# Patient Record
Sex: FEMALE | Race: BLACK OR AFRICAN AMERICAN | NOT HISPANIC OR LATINO | Employment: FULL TIME | URBAN - METROPOLITAN AREA
[De-identification: names, ages, dates, MRNs, and addresses within clinical notes are randomized per-mention and may not be internally consistent; named-entity substitution may affect disease eponyms.]

---

## 2017-09-01 ENCOUNTER — APPOINTMENT (OUTPATIENT)
Dept: CT IMAGING | Facility: HOSPITAL | Age: 42
End: 2017-09-01

## 2017-09-01 ENCOUNTER — HOSPITAL ENCOUNTER (EMERGENCY)
Facility: HOSPITAL | Age: 42
Discharge: HOME | End: 2017-09-01
Attending: EMERGENCY MEDICINE | Admitting: EMERGENCY MEDICINE

## 2017-09-01 VITALS
HEIGHT: 68 IN | RESPIRATION RATE: 16 BRPM | OXYGEN SATURATION: 97 % | BODY MASS INDEX: 34.86 KG/M2 | WEIGHT: 230 LBS | DIASTOLIC BLOOD PRESSURE: 80 MMHG | HEART RATE: 77 BPM | TEMPERATURE: 98 F | SYSTOLIC BLOOD PRESSURE: 131 MMHG

## 2017-09-01 DIAGNOSIS — R10.2 PELVIC PAIN: ICD-10-CM

## 2017-09-01 DIAGNOSIS — R10.9 ABDOMINAL PAIN, UNSPECIFIED LOCATION: Primary | ICD-10-CM

## 2017-09-01 LAB
ALBUMIN SERPL-MCNC: 4 G/DL (ref 3.5–5.2)
ALBUMIN/GLOB SERPL: 1.3 G/DL
ALP SERPL-CCNC: 81 U/L (ref 39–117)
ALT SERPL W P-5'-P-CCNC: 13 U/L (ref 1–33)
ANION GAP SERPL CALCULATED.3IONS-SCNC: 10.3 MMOL/L
AST SERPL-CCNC: 17 U/L (ref 1–32)
BACTERIA UR QL AUTO: ABNORMAL /HPF
BASOPHILS # BLD AUTO: 0.05 10*3/MM3 (ref 0–0.2)
BASOPHILS NFR BLD AUTO: 0.7 % (ref 0–1.5)
BILIRUB SERPL-MCNC: 0.4 MG/DL (ref 0.1–1.2)
BILIRUB UR QL STRIP: NEGATIVE
BUN BLD-MCNC: 10 MG/DL (ref 6–20)
BUN/CREAT SERPL: 14.5 (ref 7–25)
CALCIUM SPEC-SCNC: 9.4 MG/DL (ref 8.6–10.5)
CHLORIDE SERPL-SCNC: 104 MMOL/L (ref 98–107)
CLARITY UR: CLEAR
CO2 SERPL-SCNC: 26.7 MMOL/L (ref 22–29)
COLOR UR: YELLOW
CREAT BLD-MCNC: 0.69 MG/DL (ref 0.57–1)
DEPRECATED RDW RBC AUTO: 43 FL (ref 37–54)
EOSINOPHIL # BLD AUTO: 0.41 10*3/MM3 (ref 0–0.7)
EOSINOPHIL NFR BLD AUTO: 5.7 % (ref 0.3–6.2)
ERYTHROCYTE [DISTWIDTH] IN BLOOD BY AUTOMATED COUNT: 13 % (ref 11.7–13)
GFR SERPL CREATININE-BSD FRML MDRD: 114 ML/MIN/1.73
GLOBULIN UR ELPH-MCNC: 3.1 GM/DL
GLUCOSE BLD-MCNC: 96 MG/DL (ref 65–99)
GLUCOSE UR STRIP-MCNC: NEGATIVE MG/DL
HCG SERPL QL: NEGATIVE
HCT VFR BLD AUTO: 39.2 % (ref 35.6–45.5)
HGB BLD-MCNC: 13.5 G/DL (ref 11.9–15.5)
HGB UR QL STRIP.AUTO: NEGATIVE
HYALINE CASTS UR QL AUTO: ABNORMAL /LPF
IMM GRANULOCYTES # BLD: 0 10*3/MM3 (ref 0–0.03)
IMM GRANULOCYTES NFR BLD: 0 % (ref 0–0.5)
KETONES UR QL STRIP: NEGATIVE
LEUKOCYTE ESTERASE UR QL STRIP.AUTO: ABNORMAL
LIPASE SERPL-CCNC: 12 U/L (ref 13–60)
LYMPHOCYTES # BLD AUTO: 2.13 10*3/MM3 (ref 0.9–4.8)
LYMPHOCYTES NFR BLD AUTO: 29.5 % (ref 19.6–45.3)
MCH RBC QN AUTO: 31.4 PG (ref 26.9–32)
MCHC RBC AUTO-ENTMCNC: 34.4 G/DL (ref 32.4–36.3)
MCV RBC AUTO: 91.2 FL (ref 80.5–98.2)
MONOCYTES # BLD AUTO: 0.63 10*3/MM3 (ref 0.2–1.2)
MONOCYTES NFR BLD AUTO: 8.7 % (ref 5–12)
NEUTROPHILS # BLD AUTO: 4 10*3/MM3 (ref 1.9–8.1)
NEUTROPHILS NFR BLD AUTO: 55.4 % (ref 42.7–76)
NITRITE UR QL STRIP: NEGATIVE
PH UR STRIP.AUTO: 7 [PH] (ref 5–8)
PLATELET # BLD AUTO: 258 10*3/MM3 (ref 140–500)
PMV BLD AUTO: 9.8 FL (ref 6–12)
POTASSIUM BLD-SCNC: 4.1 MMOL/L (ref 3.5–5.2)
PROT SERPL-MCNC: 7.1 G/DL (ref 6–8.5)
PROT UR QL STRIP: NEGATIVE
RBC # BLD AUTO: 4.3 10*6/MM3 (ref 3.9–5.2)
RBC # UR: ABNORMAL /HPF
REF LAB TEST METHOD: ABNORMAL
SODIUM BLD-SCNC: 141 MMOL/L (ref 136–145)
SP GR UR STRIP: 1.01 (ref 1–1.03)
SQUAMOUS #/AREA URNS HPF: ABNORMAL /HPF
UROBILINOGEN UR QL STRIP: ABNORMAL
WBC NRBC COR # BLD: 7.22 10*3/MM3 (ref 4.5–10.7)
WBC UR QL AUTO: ABNORMAL /HPF
WHOLE BLOOD HOLD SPECIMEN: NORMAL

## 2017-09-01 PROCEDURE — 96361 HYDRATE IV INFUSION ADD-ON: CPT

## 2017-09-01 PROCEDURE — 36415 COLL VENOUS BLD VENIPUNCTURE: CPT | Performed by: EMERGENCY MEDICINE

## 2017-09-01 PROCEDURE — 0 IOPAMIDOL 61 % SOLUTION: Performed by: NURSE PRACTITIONER

## 2017-09-01 PROCEDURE — 83690 ASSAY OF LIPASE: CPT | Performed by: EMERGENCY MEDICINE

## 2017-09-01 PROCEDURE — 80053 COMPREHEN METABOLIC PANEL: CPT | Performed by: EMERGENCY MEDICINE

## 2017-09-01 PROCEDURE — 85025 COMPLETE CBC W/AUTO DIFF WBC: CPT | Performed by: EMERGENCY MEDICINE

## 2017-09-01 PROCEDURE — 96375 TX/PRO/DX INJ NEW DRUG ADDON: CPT

## 2017-09-01 PROCEDURE — 25010000002 HYDROMORPHONE PER 4 MG: Performed by: NURSE PRACTITIONER

## 2017-09-01 PROCEDURE — 84703 CHORIONIC GONADOTROPIN ASSAY: CPT | Performed by: EMERGENCY MEDICINE

## 2017-09-01 PROCEDURE — 99283 EMERGENCY DEPT VISIT LOW MDM: CPT

## 2017-09-01 PROCEDURE — 81001 URINALYSIS AUTO W/SCOPE: CPT | Performed by: EMERGENCY MEDICINE

## 2017-09-01 PROCEDURE — 25010000002 ONDANSETRON PER 1 MG: Performed by: NURSE PRACTITIONER

## 2017-09-01 PROCEDURE — 74177 CT ABD & PELVIS W/CONTRAST: CPT

## 2017-09-01 PROCEDURE — 96374 THER/PROPH/DIAG INJ IV PUSH: CPT

## 2017-09-01 RX ORDER — HYDROCODONE BITARTRATE AND ACETAMINOPHEN 5; 325 MG/1; MG/1
1 TABLET ORAL EVERY 6 HOURS PRN
Qty: 12 TABLET | Refills: 0 | Status: SHIPPED | OUTPATIENT
Start: 2017-09-01 | End: 2020-06-26

## 2017-09-01 RX ORDER — SODIUM CHLORIDE 0.9 % (FLUSH) 0.9 %
10 SYRINGE (ML) INJECTION AS NEEDED
Status: DISCONTINUED | OUTPATIENT
Start: 2017-09-01 | End: 2017-09-01 | Stop reason: HOSPADM

## 2017-09-01 RX ORDER — ONDANSETRON 2 MG/ML
4 INJECTION INTRAMUSCULAR; INTRAVENOUS ONCE
Status: COMPLETED | OUTPATIENT
Start: 2017-09-01 | End: 2017-09-01

## 2017-09-01 RX ORDER — ONDANSETRON 4 MG/1
4 TABLET, ORALLY DISINTEGRATING ORAL EVERY 8 HOURS PRN
Qty: 12 TABLET | Refills: 0 | Status: SHIPPED | OUTPATIENT
Start: 2017-09-01 | End: 2020-06-26

## 2017-09-01 RX ADMIN — IOPAMIDOL 85 ML: 612 INJECTION, SOLUTION INTRAVENOUS at 17:44

## 2017-09-01 RX ADMIN — SODIUM CHLORIDE 1000 ML: 9 INJECTION, SOLUTION INTRAVENOUS at 17:17

## 2017-09-01 RX ADMIN — HYDROMORPHONE HYDROCHLORIDE 1 MG: 1 INJECTION, SOLUTION INTRAMUSCULAR; INTRAVENOUS; SUBCUTANEOUS at 17:19

## 2017-09-01 RX ADMIN — ONDANSETRON 4 MG: 2 INJECTION INTRAMUSCULAR; INTRAVENOUS at 17:18

## 2017-09-01 NOTE — ED PROVIDER NOTES
Pt presents c/o constant abdominal pain which began yesterday morning. She states that pain is exacerbated with movement. Pt c/o nausea and loose stool. Pt denies vomiting, dysuria, cough, colds, and urinary frequency. Pt states that her [facility] was two weeks ago. Pt has had similar pain before and was seen by her gynecologist, who wanted to perform an ablation, but pt refused at that time. Discussed CT abd/pelvis which showed a ovarian cyst. Agree with plan of care.    PEx  Alert, oriented x3  Heart: RRR  Lung: clear  Abdominal: pain and tenderness to the RLQ, no guarding or rebound.    Lab work and UA reviewed. Very likely etiology of pain is from gynecologic source. I don't suspect any emergent condition such as torsion or infection. Will have her follow up with her gynecologist and gave her warning signs to return.  I supervised care provided by the midlevel provider.    We have discussed this patient's history, physical exam, and treatment plan.   I have reviewed the note and personally saw and examined the patient and agree with the plan of care.    Documentation assistance provided by alivia Hugo for Dr. Khan.  Information recorded by the scribe was done at my direction and has been verified and validated by me.       Gi Hugo  09/01/17 1925       Dandre Khan MD  09/01/17 2011

## 2017-09-01 NOTE — ED PROVIDER NOTES
Orders:   CT Abdomen Pelvis With Contrast   Final Result       1. No acute process of bowel identified, follow up as indications   persist.       2. No obstructive uropathy.        3. Cyst abutting the cervix.       Discussed by telephone Dr. Khan at time of interpretation, 1829,   09/01/2017.       This report was finalized on 9/1/2017 6:33 PM by Dr. Ronal Wing MD.            PROGRESS NOTES:   1800- Took over pt care from LUISA Martinez.     1918- Discussed pt's case with Dr. Khan ([facility]) who agrees with the plan and will consult.     1936-BP- 114/65 HR- 94 Temp- 98.7 °F (37.1 °C) (Tympanic) O2 sat- 93%  Rechecked the patient who is in NAD and is resting comfortably. Notified pt of ovarian cyst finding on CT. Discussed plan to discharge pt with prescription for norco and zofran. Pt and family are in agreement and all questions and concerns have been addressed at this time.    Final diagnoses:   Abdominal pain, unspecified location   Pelvic pain     DISPOSITION:   DISCHARGE    Patient discharged in stable condition.    Reviewed implications of results, diagnosis, meds, responsibility to follow up, warning signs and symptoms of possible worsening, potential complications and reasons to return to ER.    Patient/Family voiced understanding of above instructions.    Discussed plan for discharge, as there is no emergent indication for admission.  Pt/family is agreeable and understands need for follow up and repeat testing.  Pt is aware that discharge does not mean that nothing is wrong but it indicates no emergency is present that requires admission and they must continue care with follow-up as given below or physician of their choice.     FOLLOW-UP  Your GYN    Schedule an appointment as soon as possible for a visit  For further evaluation and treatment         Medication List      New Prescriptions          HYDROcodone-acetaminophen 5-325 MG per tablet   Commonly known as:  NORCO   Take 1 tablet  by mouth Every 6 (Six) Hours As Needed for Severe Pain .       ondansetron ODT 4 MG disintegrating tablet   Commonly known as:  ZOFRAN ODT   Take 1 tablet by mouth Every 8 (Eight) Hours As Needed for Nausea or   Vomiting (Take with pain medication if it makes you nauseated).                 Documentation assistance provided by alivia Elizalde for Alessandro Nuñez PA-C.  Information recorded by the scribe was done at my direction and has been verified and validated by me.       Olena Elizalde  09/01/17 1938       JAYLENE Moore III  09/02/17 [id number]

## 2017-09-01 NOTE — ED PROVIDER NOTES
"EMERGENCY DEPARTMENT ENCOUNTER    CHIEF COMPLAINT  Chief Complaint: Suprapubic and RLQ abd pain  History given by:Pt  History limited by:Nothing  Room Number: 04/04  PMD: Gi Veloz MD      HPI:  Pt is a 42 y.o. female who presents with suprapubic and RLQ abd pain onset one day ago. She states her pain is exacerbated with movement. Pt also complains of nausea and diarrhea but denies vomiting, CP, SOA, urinary symptoms, or any other symptoms at this time. She denies hx of cysts.    Duration: One day  Timing:gradual  Location:suprapubic and RLQ  Radiation: none  Quality:\"pain\"  Intensity/Severity:moderate  Progression:unchanged  Associated Symptoms:nausea, diarrhea  Aggravating Factors:movement  Alleviating Factors:none  Previous Episodes:none  Treatment before arrival:Pt received no treatment PTA.    PAST MEDICAL HISTORY  Active Ambulatory Problems     Diagnosis Date Noted   • Musculoskeletal leg pain 03/22/2016     Resolved Ambulatory Problems     Diagnosis Date Noted   • No Resolved Ambulatory Problems     No Additional Past Medical History       PAST SURGICAL HISTORY  History reviewed. No pertinent surgical history.    FAMILY HISTORY  History reviewed. No pertinent family history.    SOCIAL HISTORY  Social History     Social History   • Marital status:      Spouse name: N/A   • Number of children: N/A   • Years of education: N/A     Occupational History   • Not on file.     Social History Main Topics   • Smoking status: Never Smoker   • Smokeless tobacco: Not on file   • Alcohol use No   • Drug use: No   • Sexual activity: Not on file     Other Topics Concern   • Not on file     Social History Narrative         ALLERGIES  Latex; Morphine and related; Codeine; and Morphine    REVIEW OF SYSTEMS  Review of Systems   Constitutional: Negative.  Negative for activity change, appetite change ( decreased), chills, fatigue and fever.   HENT: Negative.  Negative for congestion, ear pain, rhinorrhea and " sore throat.    Eyes: Negative.    Respiratory: Negative.  Negative for cough and shortness of breath.    Cardiovascular: Negative.  Negative for chest pain, palpitations and leg swelling ( pedal).   Gastrointestinal: Positive for abdominal pain (suprapubic and RLQ), diarrhea and nausea. Negative for constipation and vomiting.   Endocrine: Negative.    Genitourinary: Negative.  Negative for decreased urine volume, difficulty urinating, dysuria, menstrual problem, pelvic pain, urgency and vaginal discharge.   Musculoskeletal: Negative.  Negative for back pain.   Skin: Negative.  Negative for rash.   Allergic/Immunologic: Negative.    Neurological: Negative.  Negative for dizziness, weakness, light-headedness, numbness and headaches.   Hematological: Negative.    Psychiatric/Behavioral: Negative.  The patient is not nervous/anxious.    All other systems reviewed and are negative.      PHYSICAL EXAM  ED Triage Vitals   Temp Heart Rate Resp BP SpO2   09/01/17 1307 09/01/17 1307 09/01/17 1307 09/01/17 1307 09/01/17 1307   98.7 °F (37.1 °C) 94 16 153/94 98 %      Temp src Heart Rate Source Patient Position BP Location FiO2 (%)   09/01/17 1307 -- 09/01/17 1307 09/01/17 1307 --   Tympanic  Standing Right arm        Physical Exam   Constitutional: She is well-developed, well-nourished, and in no distress. No distress.   HENT:   Head: Normocephalic and atraumatic.   Mouth/Throat: Oropharynx is clear and moist and mucous membranes are normal.   Eyes: Pupils are equal, round, and reactive to light.   Neck: Normal range of motion.   Cardiovascular: Normal rate, regular rhythm and normal heart sounds.    Pulmonary/Chest: Effort normal and breath sounds normal. She has no wheezes.   Abdominal: Soft. Bowel sounds are normal. There is tenderness in the right lower quadrant and suprapubic area.   Musculoskeletal: Normal range of motion. She exhibits no edema.   Neurological: She is alert.   Skin: Skin is warm and dry. No rash noted.    Psychiatric: Mood, memory, affect and judgment normal.   Nursing note and vitals reviewed.      LAB RESULTS  Recent Results (from the past 24 hour(s))   Urinalysis With / Culture If Indicated    Collection Time: 09/01/17  1:14 PM   Result Value Ref Range    Color, UA Yellow Yellow, Straw    Appearance, UA Clear Clear    pH, UA 7.0 5.0 - 8.0    Specific Gravity, UA 1.015 1.005 - 1.030    Glucose, UA Negative Negative    Ketones, UA Negative Negative    Bilirubin, UA Negative Negative    Blood, UA Negative Negative    Protein, UA Negative Negative    Leuk Esterase, UA Trace (A) Negative    Nitrite, UA Negative Negative    Urobilinogen, UA 1.0 E.U./dL 0.2 - 1.0 E.U./dL   Urinalysis, Microscopic Only    Collection Time: 09/01/17  1:14 PM   Result Value Ref Range    RBC, UA 3-5 (A) None Seen, 0-2 /HPF    WBC, UA 3-5 (A) None Seen, 0-2 /HPF    Bacteria, UA None Seen None Seen /HPF    Squamous Epithelial Cells, UA 0-2 None Seen, 0-2 /HPF    Hyaline Casts, UA 0-2 None Seen /LPF    Methodology Automated Microscopy    Comprehensive Metabolic Panel    Collection Time: 09/01/17  1:37 PM   Result Value Ref Range    Glucose 96 65 - 99 mg/dL    BUN 10 6 - 20 mg/dL    Creatinine 0.69 0.57 - 1.00 mg/dL    Sodium 141 136 - 145 mmol/L    Potassium 4.1 3.5 - 5.2 mmol/L    Chloride 104 98 - 107 mmol/L    CO2 26.7 22.0 - 29.0 mmol/L    Calcium 9.4 8.6 - 10.5 mg/dL    Total Protein 7.1 6.0 - 8.5 g/dL    Albumin 4.00 3.50 - 5.20 g/dL    ALT (SGPT) 13 1 - 33 U/L    AST (SGOT) 17 1 - 32 U/L    Alkaline Phosphatase 81 39 - 117 U/L    Total Bilirubin 0.4 0.1 - 1.2 mg/dL    eGFR  African Amer 114 >60 mL/min/1.73    Globulin 3.1 gm/dL    A/G Ratio 1.3 g/dL    BUN/Creatinine Ratio 14.5 7.0 - 25.0    Anion Gap 10.3 mmol/L   Lipase    Collection Time: 09/01/17  1:37 PM   Result Value Ref Range    Lipase 12 (L) 13 - 60 U/L   hCG, Serum, Qualitative    Collection Time: 09/01/17  1:37 PM   Result Value Ref Range    HCG Qualitative Negative  Indeterminate, Negative   Light Blue Top    Collection Time: 09/01/17  1:37 PM   Result Value Ref Range    Extra Tube hold for add-on    CBC Auto Differential    Collection Time: 09/01/17  1:37 PM   Result Value Ref Range    WBC 7.22 4.50 - 10.70 10*3/mm3    RBC 4.30 3.90 - 5.20 10*6/mm3    Hemoglobin 13.5 11.9 - 15.5 g/dL    Hematocrit 39.2 35.6 - 45.5 %    MCV 91.2 80.5 - 98.2 fL    MCH 31.4 26.9 - 32.0 pg    MCHC 34.4 32.4 - 36.3 g/dL    RDW 13.0 11.7 - 13.0 %    RDW-SD 43.0 37.0 - 54.0 fl    MPV 9.8 6.0 - 12.0 fL    Platelets 258 140 - 500 10*3/mm3    Neutrophil % 55.4 42.7 - 76.0 %    Lymphocyte % 29.5 19.6 - 45.3 %    Monocyte % 8.7 5.0 - 12.0 %    Eosinophil % 5.7 0.3 - 6.2 %    Basophil % 0.7 0.0 - 1.5 %    Immature Grans % 0.0 0.0 - 0.5 %    Neutrophils, Absolute 4.00 1.90 - 8.10 10*3/mm3    Lymphocytes, Absolute 2.13 0.90 - 4.80 10*3/mm3    Monocytes, Absolute 0.63 0.20 - 1.20 10*3/mm3    Eosinophils, Absolute 0.41 0.00 - 0.70 10*3/mm3    Basophils, Absolute 0.05 0.00 - 0.20 10*3/mm3    Immature Grans, Absolute 0.00 0.00 - 0.03 10*3/mm3       I ordered the above labs and reviewed the results    RADIOLOGY  CT Abdomen Pelvis With Contrast    (Results Pending)       I ordered the above noted radiological studies and reviewed the images on the PACS system.        PROGRESS AND CONSULTS    1800- pt resting comfortable.  Care turned over to JAYLENE Carbajal;  Pt disp pending CT scan    COURSE & MEDICAL DECISION MAKING  Pertinent Labs and Imaging studies that were ordered and reviewed are noted above.  Results were reviewed/discussed with the patient and they were also made aware of online assess.   Pt also made aware that some labs, such as cultures, will not be resulted during ER visit and follow up with PMD is necessary.     MEDICATIONS GIVEN IN ER  Medications   sodium chloride 0.9 % flush 10 mL (not administered)   ondansetron (ZOFRAN) injection 4 mg (4 mg Intravenous Given 9/1/17 1718)   HYDROmorphone  "(DILAUDID) injection 1 mg (1 mg Intravenous Given 9/1/17 1719)   sodium chloride 0.9 % bolus 1,000 mL (1,000 mL Intravenous New Bag 9/1/17 1717)   iopamidol (ISOVUE-300) 61 % injection 100 mL (85 mL Intravenous Given 9/1/17 1744)       /94 (BP Location: Right arm, Patient Position: Standing)  Pulse 94  Temp 98.7 °F (37.1 °C) (Tympanic)   Resp 16  Ht 68\" (172.7 cm)  Wt 230 lb (104 kg)  SpO2 98%  BMI 34.97 kg/m2        Documentation assistance provided by alivia Kennedy for Yessi Huitron (APRN).  Information recorded by the scribe was done at my direction and has been verified and validated by me.     Krystin Kennedy  09/01/17 5610       LUISA Borrero  09/01/17 5316    "

## 2017-09-06 ENCOUNTER — TELEPHONE (OUTPATIENT)
Dept: SOCIAL WORK | Facility: HOSPITAL | Age: 42
End: 2017-09-06

## 2017-12-13 ENCOUNTER — OFFICE (OUTPATIENT)
Dept: URBAN - METROPOLITAN AREA CLINIC 75 | Facility: CLINIC | Age: 42
End: 2017-12-13

## 2017-12-13 VITALS
HEART RATE: 72 BPM | SYSTOLIC BLOOD PRESSURE: 128 MMHG | WEIGHT: 222 LBS | DIASTOLIC BLOOD PRESSURE: 82 MMHG | HEIGHT: 68 IN

## 2017-12-13 DIAGNOSIS — R19.4 CHANGE IN BOWEL HABIT: ICD-10-CM

## 2017-12-13 DIAGNOSIS — R10.31 RIGHT LOWER QUADRANT PAIN: ICD-10-CM

## 2017-12-13 DIAGNOSIS — K59.00 CONSTIPATION, UNSPECIFIED: ICD-10-CM

## 2017-12-13 PROCEDURE — 99244 OFF/OP CNSLTJ NEW/EST MOD 40: CPT | Performed by: INTERNAL MEDICINE

## 2017-12-13 RX ORDER — LUBIPROSTONE 8 UG/1
16 CAPSULE, GELATIN COATED ORAL
Qty: 180 | Refills: 3 | Status: ACTIVE
Start: 2017-12-13

## 2018-01-17 VITALS
OXYGEN SATURATION: 100 % | HEART RATE: 76 BPM | DIASTOLIC BLOOD PRESSURE: 76 MMHG | SYSTOLIC BLOOD PRESSURE: 127 MMHG | RESPIRATION RATE: 19 BRPM | SYSTOLIC BLOOD PRESSURE: 97 MMHG | TEMPERATURE: 97.5 F | HEART RATE: 78 BPM | HEART RATE: 74 BPM | HEART RATE: 84 BPM | DIASTOLIC BLOOD PRESSURE: 74 MMHG | HEART RATE: 75 BPM | DIASTOLIC BLOOD PRESSURE: 67 MMHG | SYSTOLIC BLOOD PRESSURE: 96 MMHG | HEART RATE: 77 BPM | DIASTOLIC BLOOD PRESSURE: 93 MMHG | SYSTOLIC BLOOD PRESSURE: 134 MMHG | DIASTOLIC BLOOD PRESSURE: 66 MMHG | HEIGHT: 68 IN | TEMPERATURE: 98.9 F | DIASTOLIC BLOOD PRESSURE: 84 MMHG | DIASTOLIC BLOOD PRESSURE: 70 MMHG | SYSTOLIC BLOOD PRESSURE: 132 MMHG | RESPIRATION RATE: 24 BRPM | RESPIRATION RATE: 20 BRPM | SYSTOLIC BLOOD PRESSURE: 149 MMHG | WEIGHT: 215 LBS | SYSTOLIC BLOOD PRESSURE: 133 MMHG | RESPIRATION RATE: 21 BRPM

## 2018-01-19 ENCOUNTER — AMBULATORY SURGICAL CENTER (OUTPATIENT)
Dept: URBAN - METROPOLITAN AREA SURGERY 17 | Facility: SURGERY | Age: 43
End: 2018-01-19

## 2018-01-19 DIAGNOSIS — R19.4 CHANGE IN BOWEL HABIT: ICD-10-CM

## 2018-01-19 DIAGNOSIS — R10.31 RIGHT LOWER QUADRANT PAIN: ICD-10-CM

## 2018-01-19 DIAGNOSIS — K59.00 CONSTIPATION, UNSPECIFIED: ICD-10-CM

## 2018-01-19 PROCEDURE — 45378 DIAGNOSTIC COLONOSCOPY: CPT | Performed by: INTERNAL MEDICINE

## 2018-01-19 RX ORDER — LUBIPROSTONE 8 UG/1
16 CAPSULE, GELATIN COATED ORAL
Qty: 180 | Refills: 3 | Status: ACTIVE
Start: 2017-12-13

## 2018-01-19 RX ADMIN — PROPOFOL 25 MG: 10 INJECTION, EMULSION INTRAVENOUS at 10:50

## 2018-01-19 RX ADMIN — PROPOFOL 25 MG: 10 INJECTION, EMULSION INTRAVENOUS at 10:51

## 2018-01-19 RX ADMIN — PROPOFOL 50 MG: 10 INJECTION, EMULSION INTRAVENOUS at 10:43

## 2018-01-19 RX ADMIN — LIDOCAINE HYDROCHLORIDE 25 MG: 10 INJECTION, SOLUTION EPIDURAL; INFILTRATION; INTRACAUDAL; PERINEURAL at 10:40

## 2018-01-19 RX ADMIN — PROPOFOL 50 MG: 10 INJECTION, EMULSION INTRAVENOUS at 10:47

## 2018-01-19 RX ADMIN — PROPOFOL 100 MG: 10 INJECTION, EMULSION INTRAVENOUS at 10:40

## 2020-06-26 ENCOUNTER — OFFICE VISIT (OUTPATIENT)
Dept: FAMILY MEDICINE CLINIC | Facility: CLINIC | Age: 45
End: 2020-06-26

## 2020-06-26 VITALS
OXYGEN SATURATION: 96 % | HEART RATE: 72 BPM | BODY MASS INDEX: 36.06 KG/M2 | SYSTOLIC BLOOD PRESSURE: 130 MMHG | DIASTOLIC BLOOD PRESSURE: 86 MMHG | TEMPERATURE: 98 F | RESPIRATION RATE: 14 BRPM | HEIGHT: 68 IN | WEIGHT: 237.9 LBS

## 2020-06-26 DIAGNOSIS — M25.562 CHRONIC PAIN OF LEFT KNEE: Primary | ICD-10-CM

## 2020-06-26 DIAGNOSIS — M25.362 KNEE INSTABILITY, LEFT: ICD-10-CM

## 2020-06-26 DIAGNOSIS — G89.29 CHRONIC PAIN OF LEFT KNEE: Primary | ICD-10-CM

## 2020-06-26 DIAGNOSIS — K59.04 CHRONIC IDIOPATHIC CONSTIPATION: ICD-10-CM

## 2020-06-26 PROBLEM — E55.9 AVITAMINOSIS D: Status: ACTIVE | Noted: 2020-06-26

## 2020-06-26 PROCEDURE — 99214 OFFICE O/P EST MOD 30 MIN: CPT | Performed by: FAMILY MEDICINE

## 2020-06-26 RX ORDER — MULTIVIT WITH MINERALS/LUTEIN
250 TABLET ORAL DAILY
COMMUNITY
End: 2020-11-06
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
## 2020-07-10 ENCOUNTER — HOSPITAL ENCOUNTER (OUTPATIENT)
Dept: GENERAL RADIOLOGY | Facility: HOSPITAL | Age: 45
Discharge: HOME | End: 2020-07-10
Admitting: FAMILY MEDICINE

## 2020-07-10 DIAGNOSIS — M25.362 KNEE INSTABILITY, LEFT: ICD-10-CM

## 2020-07-10 DIAGNOSIS — M25.562 CHRONIC PAIN OF LEFT KNEE: ICD-10-CM

## 2020-07-10 DIAGNOSIS — G89.29 CHRONIC PAIN OF LEFT KNEE: ICD-10-CM

## 2020-07-10 PROCEDURE — 73562 X-RAY EXAM OF KNEE 3: CPT

## 2020-07-14 DIAGNOSIS — G89.29 CHRONIC PAIN OF LEFT KNEE: Primary | ICD-10-CM

## 2020-07-14 DIAGNOSIS — M25.562 CHRONIC PAIN OF LEFT KNEE: Primary | ICD-10-CM

## 2020-07-14 DIAGNOSIS — M25.462 SWELLING OF KNEE JOINT, LEFT: ICD-10-CM

## 2020-07-14 DIAGNOSIS — M23.52 RECURRENT LEFT KNEE INSTABILITY: ICD-10-CM

## 2020-07-19 ENCOUNTER — HOSPITAL ENCOUNTER (OUTPATIENT)
Dept: MRI IMAGING | Facility: HOSPITAL | Age: 45
Discharge: HOME | End: 2020-07-19
Admitting: FAMILY MEDICINE

## 2020-07-19 DIAGNOSIS — G89.29 CHRONIC PAIN OF LEFT KNEE: ICD-10-CM

## 2020-07-19 DIAGNOSIS — M25.562 CHRONIC PAIN OF LEFT KNEE: ICD-10-CM

## 2020-07-19 DIAGNOSIS — M23.52 RECURRENT LEFT KNEE INSTABILITY: ICD-10-CM

## 2020-07-19 DIAGNOSIS — M25.462 SWELLING OF KNEE JOINT, LEFT: ICD-10-CM

## 2020-07-19 PROCEDURE — 73721 MRI JNT OF LWR EXTRE W/O DYE: CPT

## 2020-11-06 ENCOUNTER — OFFICE VISIT (OUTPATIENT)
Dept: FAMILY MEDICINE CLINIC | Facility: CLINIC | Age: 45
End: 2020-11-06

## 2020-11-06 VITALS
HEART RATE: 78 BPM | DIASTOLIC BLOOD PRESSURE: 68 MMHG | HEIGHT: 68 IN | RESPIRATION RATE: 16 BRPM | TEMPERATURE: 97.1 F | SYSTOLIC BLOOD PRESSURE: 128 MMHG | BODY MASS INDEX: 36.17 KG/M2 | OXYGEN SATURATION: 98 %

## 2020-11-06 DIAGNOSIS — Z13.220 SCREENING FOR HYPERLIPIDEMIA: ICD-10-CM

## 2020-11-06 DIAGNOSIS — M25.561 CHRONIC PAIN OF RIGHT KNEE: ICD-10-CM

## 2020-11-06 DIAGNOSIS — M25.562 PATELLOFEMORAL ARTHRALGIA OF LEFT KNEE: ICD-10-CM

## 2020-11-06 DIAGNOSIS — E04.9 ENLARGED THYROID: ICD-10-CM

## 2020-11-06 DIAGNOSIS — G89.29 CHRONIC PAIN OF RIGHT KNEE: ICD-10-CM

## 2020-11-06 DIAGNOSIS — Z11.59 ENCOUNTER FOR HEPATITIS C SCREENING TEST FOR LOW RISK PATIENT: ICD-10-CM

## 2020-11-06 DIAGNOSIS — Z00.00 ANNUAL PHYSICAL EXAM: Primary | ICD-10-CM

## 2020-11-06 PROBLEM — F41.9 ANXIETY: Status: ACTIVE | Noted: 2020-11-06

## 2020-11-06 PROBLEM — K59.04 CHRONIC IDIOPATHIC CONSTIPATION: Status: ACTIVE | Noted: 2020-11-06

## 2020-11-06 PROCEDURE — 99396 PREV VISIT EST AGE 40-64: CPT | Performed by: FAMILY MEDICINE

## 2020-11-06 NOTE — PROGRESS NOTES
"Chief Complaint   Patient presents with   • Annual Exam       Subjective   Aamir White is a 45 y.o. female.     History of Present Illness   Annual exam  She follows with gyn Dr. Montes.   She is behind on her yearly this year but will work to schedule it.   She is working on exercise. 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   She works on her diet and feels she does pretty well most of the time.   She walks their dog a few times per week.     Knee pain  Says doing oils. Ingesting coppa eba. Feels this helps.   Still has pain especially when working out. But feeling better.   She joined a work out group. Did a bike workout on upright cycle and this really hurt.   She had MRI but we missed our follow up in the office. She has abnormal MRI of left knee with patellofemoral cartilage destruction. Prefers to stand or leave the left leg straightened if she is sitting.     The following portions of the patient's history were reviewed and updated as appropriate: allergies, current medications, past family history, past medical history, past social history, past surgical history and problem list.    Review of Systems   Respiratory: Negative.    Cardiovascular: Negative.    Neurological: Negative.        /68 (BP Location: Left arm, Patient Position: Sitting, Cuff Size: Adult)   Pulse 78   Temp 97.1 °F (36.2 °C) (Temporal)   Resp 16   Ht 172.7 cm (68\")   LMP 10/31/2020 (Exact Date)   SpO2 98%   Breastfeeding No   BMI 36.17 kg/m²       Objective   Physical Exam  Vitals signs reviewed.   Constitutional:       General: She is not in acute distress.  HENT:      Right Ear: Tympanic membrane, ear canal and external ear normal.      Left Ear: Tympanic membrane, ear canal and external ear normal.      Nose: Nose normal.      Mouth/Throat:      Pharynx: No oropharyngeal exudate.   Eyes:      General: No scleral " icterus.        Right eye: No discharge.         Left eye: No discharge.      Conjunctiva/sclera: Conjunctivae normal.   Neck:      Musculoskeletal: Neck supple.      Thyroid: No thyromegaly.      Comments: Thyroid enlaregment bilaterally.   Cardiovascular:      Rate and Rhythm: Normal rate and regular rhythm.      Heart sounds: Normal heart sounds. No murmur. No friction rub. No gallop.    Pulmonary:      Effort: Pulmonary effort is normal. No respiratory distress.      Breath sounds: Normal breath sounds. No wheezing or rales.   Chest:      Chest wall: No tenderness.   Abdominal:      General: Bowel sounds are normal. There is no distension.      Palpations: Abdomen is soft. There is no mass.      Tenderness: There is no abdominal tenderness. There is no guarding.   Musculoskeletal:         General: No swelling or deformity.   Lymphadenopathy:      Cervical: No cervical adenopathy.   Skin:     General: Skin is warm and dry.   Neurological:      Mental Status: She is alert and oriented to person, place, and time.      Motor: No abnormal muscle tone.      Coordination: Coordination normal.   Psychiatric:         Behavior: Behavior normal.         Assessment/Plan   Diagnoses and all orders for this visit:    1. Annual physical exam (Primary)  -     CBC & Differential  -     Comprehensive Metabolic Panel    2. Patellofemoral arthralgia of left knee  -     Cancel: Ambulatory Referral to Orthopedic Surgery  -     Ambulatory Referral to Orthopedic Surgery    3. Encounter for hepatitis C screening test for low risk patient  -     Hepatitis C Antibody    4. Screening for hyperlipidemia  -     Lipid Panel    5. Enlarged thyroid  -     TSH  -     T4    6. Chronic pain of right knee  -     Ambulatory Referral to Orthopedic Surgery    Preventive counseling  She needs to get in with gynecology for her pap and mammogram. She is going to call.   She does not want vaccines after discussion. Recommended flu.   Will get u/s of  thyroid. She had one several years ago and she says had nodule needing FNA, returned benign per pt.

## 2020-11-07 LAB
ALBUMIN SERPL-MCNC: 3.9 G/DL (ref 3.5–5.2)
ALBUMIN/GLOB SERPL: 1.7 G/DL
ALP SERPL-CCNC: 84 U/L (ref 39–117)
ALT SERPL-CCNC: 11 U/L (ref 1–33)
AST SERPL-CCNC: 15 U/L (ref 1–32)
BASOPHILS # BLD AUTO: 0.06 10*3/MM3 (ref 0–0.2)
BASOPHILS NFR BLD AUTO: 0.9 % (ref 0–1.5)
BILIRUB SERPL-MCNC: 0.2 MG/DL (ref 0–1.2)
BUN SERPL-MCNC: 11 MG/DL (ref 6–20)
BUN/CREAT SERPL: 13.9 (ref 7–25)
CALCIUM SERPL-MCNC: 8.8 MG/DL (ref 8.6–10.5)
CHLORIDE SERPL-SCNC: 107 MMOL/L (ref 98–107)
CHOLEST SERPL-MCNC: 151 MG/DL (ref 0–200)
CO2 SERPL-SCNC: 26.8 MMOL/L (ref 22–29)
CREAT SERPL-MCNC: 0.79 MG/DL (ref 0.57–1)
EOSINOPHIL # BLD AUTO: 0.23 10*3/MM3 (ref 0–0.4)
EOSINOPHIL NFR BLD AUTO: 3.6 % (ref 0.3–6.2)
ERYTHROCYTE [DISTWIDTH] IN BLOOD BY AUTOMATED COUNT: 13.2 % (ref 12.3–15.4)
GLOBULIN SER CALC-MCNC: 2.3 GM/DL
GLUCOSE SERPL-MCNC: 71 MG/DL (ref 65–99)
HCT VFR BLD AUTO: 34.3 % (ref 34–46.6)
HCV AB S/CO SERPL IA: <0.1 S/CO RATIO (ref 0–0.9)
HDLC SERPL-MCNC: 56 MG/DL (ref 40–60)
HGB BLD-MCNC: 11.3 G/DL (ref 12–15.9)
IMM GRANULOCYTES # BLD AUTO: 0.02 10*3/MM3 (ref 0–0.05)
IMM GRANULOCYTES NFR BLD AUTO: 0.3 % (ref 0–0.5)
LDLC SERPL CALC-MCNC: 79 MG/DL (ref 0–100)
LYMPHOCYTES # BLD AUTO: 2.21 10*3/MM3 (ref 0.7–3.1)
LYMPHOCYTES NFR BLD AUTO: 34.2 % (ref 19.6–45.3)
MCH RBC QN AUTO: 28.8 PG (ref 26.6–33)
MCHC RBC AUTO-ENTMCNC: 32.9 G/DL (ref 31.5–35.7)
MCV RBC AUTO: 87.5 FL (ref 79–97)
MONOCYTES # BLD AUTO: 0.73 10*3/MM3 (ref 0.1–0.9)
MONOCYTES NFR BLD AUTO: 11.3 % (ref 5–12)
NEUTROPHILS # BLD AUTO: 3.21 10*3/MM3 (ref 1.7–7)
NEUTROPHILS NFR BLD AUTO: 49.7 % (ref 42.7–76)
NRBC BLD AUTO-RTO: 0 /100 WBC (ref 0–0.2)
PLATELET # BLD AUTO: 270 10*3/MM3 (ref 140–450)
POTASSIUM SERPL-SCNC: 4.2 MMOL/L (ref 3.5–5.2)
PROT SERPL-MCNC: 6.2 G/DL (ref 6–8.5)
RBC # BLD AUTO: 3.92 10*6/MM3 (ref 3.77–5.28)
SODIUM SERPL-SCNC: 140 MMOL/L (ref 136–145)
T4 SERPL-MCNC: 7.71 MCG/DL (ref 4.5–11.7)
TRIGL SERPL-MCNC: 83 MG/DL (ref 0–150)
TSH SERPL DL<=0.005 MIU/L-ACNC: 0.68 UIU/ML (ref 0.27–4.2)
VLDLC SERPL CALC-MCNC: 16 MG/DL (ref 5–40)
WBC # BLD AUTO: 6.46 10*3/MM3 (ref 3.4–10.8)

## 2020-12-01 ENCOUNTER — OFFICE VISIT (OUTPATIENT)
Dept: ORTHOPEDIC SURGERY | Facility: CLINIC | Age: 45
End: 2020-12-01

## 2020-12-01 VITALS — TEMPERATURE: 95.4 F | HEIGHT: 68 IN | WEIGHT: 238.1 LBS | BODY MASS INDEX: 36.09 KG/M2

## 2020-12-01 DIAGNOSIS — M17.0 PRIMARY OSTEOARTHRITIS OF BOTH KNEES: ICD-10-CM

## 2020-12-01 DIAGNOSIS — M25.561 RIGHT KNEE PAIN, UNSPECIFIED CHRONICITY: Primary | ICD-10-CM

## 2020-12-01 PROCEDURE — 99213 OFFICE O/P EST LOW 20 MIN: CPT | Performed by: ORTHOPAEDIC SURGERY

## 2020-12-01 PROCEDURE — 73562 X-RAY EXAM OF KNEE 3: CPT | Performed by: ORTHOPAEDIC SURGERY

## 2020-12-01 RX ORDER — MELOXICAM 15 MG/1
TABLET ORAL
Qty: 90 TABLET | Refills: 3 | Status: SHIPPED | OUTPATIENT
Start: 2020-12-01 | End: 2021-07-20 | Stop reason: SDUPTHER

## 2020-12-01 NOTE — PROGRESS NOTES
"willowPatient: Aamir White  YOB: 1975 45 y.o. female  Medical Record Number: [id number]    Chief Complaints:   Chief Complaint   Patient presents with   • Left Knee - Pain, Initial Evaluation   • Right Knee - Pain, Initial Evaluation       History of Present Illness:Aamir White is a 45 y.o. female who presents with bilateral knee pain right greater than left ongoing for 4 or 5 years.  She denies any history of recent injury or change in activity level.  She does have a family history of knee arthritis.  Has had many relatives that have had knee replacements.  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  She has pain which she rates as moderate crushing aching type pain.     Allergies:   Allergies   Allergen Reactions   • Latex Hives, Itching and Swelling   • Morphine And Related    • Codeine Hallucinations   • Morphine Itching and Rash       Medications:   No current outpatient medications on file.     No current facility-administered medications for this visit.          The following portions of the patient's history were reviewed and updated as appropriate: allergies, current medications, past family history, past medical history, past social history, past surgical history and problem list.    Review of Systems:   A 14 point review of systems was performed. All systems negative except pertinent positives/negative listed in HPI above    Physical Exam:   Vitals:    12/01/20 0854   Temp: 95.4 °F (35.2 °C)   TempSrc: Temporal   Weight: 108 kg (238 lb 1.6 oz)   Height: 172.7 cm (67.99\")   PainSc:   2   PainLoc: Knee       General: A and O x 3, ASA, NAD    SCLERA:    Normal    DENTITION:   Normal   Knee:  bilateral    ALIGNMENT:    Neutral to slight Varus  ,   Patella  tracks  midline    GAIT:    Antalgic    SKIN:    No abnormality    RANGE OF MOTION:   3  -  120   DEG    STRENGTH:   4  / 5    LIGAMENTS:    No varus / valgus instability.   Negative  " Lachman.    MENISCUS:     Negative   Asa       DISTAL PULSES:    Paplable    DISTAL SENSATION :   Intact    LYMPHATICS:     No   lymphadenopathy    OTHER:          - Positive trace  effusion      - Crepitance with ROM         Radiology:  Xrays 3views right knee (ap,lateral, sunrise) were ordered and reviewed for evaluation of knee pain demonstrating mild joint space narrowing and osteophyte formation consistent with mild to moderate osteoarthritis.  I also reviewed films of the left knee taken a few months ago which confirmed the same process.  There are no previous films otherwise for comparison.    Assessment/Plan: Bilateral knee osteoarthritis mild to moderate in nature with significant symptoms currently.  We will going to try with some conservative measures I will place her on meloxicam once daily I counseled her to take with food.  Also send her to physical therapy for home exercise program.  Neck step would be injections Weatherbee gel or steroid.  If she fails to progress appropriately with the current conservative measures she can call back at any point we will get the neck step set up.      Jack Harrell MD  12/1/2020

## 2020-12-28 ENCOUNTER — TREATMENT (OUTPATIENT)
Dept: PHYSICAL THERAPY | Facility: CLINIC | Age: 45
End: 2020-12-28

## 2020-12-28 DIAGNOSIS — M17.0 PRIMARY OSTEOARTHRITIS OF BOTH KNEES: Primary | ICD-10-CM

## 2020-12-28 PROCEDURE — 97161 PT EVAL LOW COMPLEX 20 MIN: CPT | Performed by: PHYSICAL THERAPIST

## 2020-12-28 PROCEDURE — 97110 THERAPEUTIC EXERCISES: CPT | Performed by: PHYSICAL THERAPIST

## 2021-01-07 ENCOUNTER — TREATMENT (OUTPATIENT)
Dept: PHYSICAL THERAPY | Facility: CLINIC | Age: 46
End: 2021-01-07

## 2021-01-07 DIAGNOSIS — M17.0 PRIMARY OSTEOARTHRITIS OF BOTH KNEES: Primary | ICD-10-CM

## 2021-01-07 PROCEDURE — 97110 THERAPEUTIC EXERCISES: CPT | Performed by: PHYSICAL THERAPIST

## 2021-01-07 PROCEDURE — 97530 THERAPEUTIC ACTIVITIES: CPT | Performed by: PHYSICAL THERAPIST

## 2021-01-07 NOTE — PROGRESS NOTES
Physical Therapy Daily Progress Note      Aamir Outten reports: knees ached a little more yesterday.  Was on feet more, runnning errands, etc.      Subjective     Objective     See Exercise, Manual, and Modality Logs for complete treatment.   Exercise rationale/ pain free exercise performance  Anatomy and structure of affected musculature  Posture/Postural awareness  Proper Work station set up  Ice application  Alternate exercise positions - stretching  Proper shoe wear/support  Verbal/Tactile cues to ensure correct exercise performance/technique    Added: piriformis, hs, quad/hip flexor stretch, isometric hip add ball squeezes, Nu Step x 7 min    Increased reps with SLR      Assessment/Plan  Noted increased mm tightness and strengthening exercise performance difficulty left vs right LE.  Able to perform increased repetition and exercise activity without increased symptoms but fatigues easily with isolated quad strengthening activities.  Subjectively she reports benefit from addition of stretches this session and verbalizes understanding regarding importance of regular exercise performance.  Should continue to improve with continued PT efforts/intervention.    Progress strengthening /stabilization /functional activity as appropriate for affected musculature.           Manual Therapy:        mins  00995;  Therapeutic Exercise:   28   mins  14984;     Neuromuscular Elsa:      mins  49917;    Therapeutic Activity:    16      mins  55536;     Gait Training:         mins  48734;     Ultrasound:         mins  84293;    Electrical Stimulation:        mins  76421 ( );      Timed Treatment: 44   mins   Total Treatment:     44   mins    Junior Luevano PTA    Physical Therapist Assistant [place] [address]

## 2021-01-14 ENCOUNTER — TREATMENT (OUTPATIENT)
Dept: PHYSICAL THERAPY | Facility: CLINIC | Age: 46
End: 2021-01-14

## 2021-01-14 DIAGNOSIS — M17.0 PRIMARY OSTEOARTHRITIS OF BOTH KNEES: Primary | ICD-10-CM

## 2021-01-14 PROCEDURE — 97530 THERAPEUTIC ACTIVITIES: CPT | Performed by: PHYSICAL THERAPIST

## 2021-01-14 PROCEDURE — 97110 THERAPEUTIC EXERCISES: CPT | Performed by: PHYSICAL THERAPIST

## 2021-01-14 NOTE — PROGRESS NOTES
Physical Therapy Daily Progress Note    Visit #3    Subjective     Tamecka Outten reports: feeling pretty good today. Feels that exercises are helping.      Objective   See Exercise, Manual, and Modality Logs for complete treatment.       Assessment/Plan  Increased repetitions today, and added sidelying clamshells. Tolerated well without increased pain, did report muscle fatigue.   Progress per Plan of Care           Manual Therapy:    0     mins  07397;  Therapeutic Exercise:    26     mins  97751;     Neuromuscular Elsa:    0    mins  79834;    Therapeutic Activity:     16     mins  78480;     Gait Trainin     mins  40348;     Ultrasound:     0     mins  38274;    Electrical Stimulation:    0     mins  20360 ( );    Timed Treatment:   42   mins   Total Treatment:     42   mins    Dia Henley PT, DPT  Physical Therapist  [place] License #[id number]

## 2021-01-21 ENCOUNTER — TREATMENT (OUTPATIENT)
Dept: PHYSICAL THERAPY | Facility: CLINIC | Age: 46
End: 2021-01-21

## 2021-01-21 DIAGNOSIS — M17.0 PRIMARY OSTEOARTHRITIS OF BOTH KNEES: Primary | ICD-10-CM

## 2021-01-21 PROCEDURE — 97035 APP MDLTY 1+ULTRASOUND EA 15: CPT | Performed by: PHYSICAL THERAPIST

## 2021-01-21 PROCEDURE — 97140 MANUAL THERAPY 1/> REGIONS: CPT | Performed by: PHYSICAL THERAPIST

## 2021-01-21 PROCEDURE — 97014 ELECTRIC STIMULATION THERAPY: CPT | Performed by: PHYSICAL THERAPIST

## 2021-01-21 NOTE — PATIENT INSTRUCTIONS
Access Code: [email or web address]   URL: [email or web address]   Date: 01/21/2021   Prepared by: Dia Henley     Exercises  Supine ITB Stretch with Strap - 3 reps - 20 hold - 2x daily  Supine ITB Stretch - 3 reps - 20 hold - 2x daily

## 2021-01-21 NOTE — PROGRESS NOTES
Physical Therapy Daily Progress Note    Visit #4    Subjective     Aamir Outten reports: significant increase in right lateral knee pain over the last few days, unsure of cause. Difficulty bearing weight on her right LE.      Objective   See Exercise, Manual, and Modality Logs for complete treatment.       Assessment/Plan  Pt is point tender over distal ITB. Focused on STM and modalities today and added ITB stretch to HEP. Will assess next visit and resume exercises if able, is pain still severe will refer back to MD for further medical management.  Progress per Plan of Care           Manual Therapy:    15     mins  78496;  Therapeutic Exercise:    0     mins  20349;     Neuromuscular Elsa:    0    mins  25993;    Therapeutic Activity:     0     mins  51673;     Gait Trainin     mins  18055;     Ultrasound:     8     mins  96821;    Electrical Stimulation:    15     mins  63238 ( );    Timed Treatment:   23   mins   Total Treatment:     38   mins    Dia Henley PT, DPT  Physical Therapist  [place] License #[id number]

## 2021-02-25 ENCOUNTER — TREATMENT (OUTPATIENT)
Dept: PHYSICAL THERAPY | Facility: CLINIC | Age: 46
End: 2021-02-25

## 2021-02-25 DIAGNOSIS — M17.0 PRIMARY OSTEOARTHRITIS OF BOTH KNEES: Primary | ICD-10-CM

## 2021-02-25 PROCEDURE — 97014 ELECTRIC STIMULATION THERAPY: CPT | Performed by: PHYSICAL THERAPIST

## 2021-02-25 PROCEDURE — 97035 APP MDLTY 1+ULTRASOUND EA 15: CPT | Performed by: PHYSICAL THERAPIST

## 2021-02-25 PROCEDURE — 97140 MANUAL THERAPY 1/> REGIONS: CPT | Performed by: PHYSICAL THERAPIST

## 2021-02-25 NOTE — PROGRESS NOTES
Re-Assessment / Re-Certification    Patient: Aamir White   : 1975  Diagnosis/ICD-10 Code:  Primary osteoarthritis of both knees [M17.0]  Referring practitioner: Jack Harrell MD  Date of Initial Visit: 2020  Today's Date: 2021  Patient seen for 5 sessions      Subjective:   Aamir White reports: left knee is a lot better, right knee is still very painful. Difficulty controlling motion when sitting down, descending stairs. Adherent with HEP, exercises feel good but being up on her feet is very painful on right knee.  Subjective Questionnaire: LEFS:   Clinical Progress: worsened  Home Program Compliance: Yes  Treatment has included: therapeutic exercise, therapeutic activity, electrical stimulation, ultrasound and cryotherapy    Subjective   Objective          Active Range of Motion   Left Knee   Flexion: 126 degrees   Extension: 0 degrees     Right Knee   Flexion: 122 degrees   Extension: 4 degrees     Strength/Myotome Testing     Left Hip   Planes of Motion   Flexion: 4+  Extension: 4+  Abduction: 4+  Adduction: 4+    Right Hip   Planes of Motion   Flexion: 4+  Extension: 4  Abduction: 4  Adduction: 4    Left Knee   Flexion: 5  Extension: 5    Right Knee   Flexion: 3-  Extension: 3-      Assessment/Plan  Progress toward previous goals: Partially Met    Short Term Goals: 2-4 weeks. Patient will:  1. Be independent with initial HEP (MET)  2. Be instructed in posture and body mechanics (MET)    Long Term Goals: 4-6 weeks. Patient will:  1. Demonstrate improved Bilateral lower extremity MMT of >/= 4+/5 (PROGRESSING)  2. Demonstrate lower extremity flexibility WFL. (PROGRESSING)  3. Demonstrate ability to climb stairs with reciprocal pattern with </= 1/10 pain. (NOT MET)  4. Report ability to walk for exercise for 30 minutes or more with </= 1/10 pain. (NOT MET)  5. LEFS 68/80 or better. (NOT MET)      Recommendations: Left knee doing well, but significant antalgic gait on right. Pt to call  for follow up with MD regarding right knee. Will continue PT if indicated after that.   Timeframe: 1 month  Prognosis to achieve goals: good    PT Signature: Dia Henley, PT, DPT                         Physical Therapist                         [place] License #[id number]    Based upon review of the patient's progress and continued therapy plan, it is my medical opinion that Aamir White should continue physical therapy treatment at [facility]  [address] [place] [id number] [phone].    Signature: __________________________________  Jack Harrell MD    Manual Therapy:    18     mins  31062;  Therapeutic Exercise:    0     mins  32619;     Neuromuscular Elsa:    0    mins  28321;    Therapeutic Activity:     0     mins  32064;     Gait Trainin     mins  39205;     Ultrasound:     8     mins  29892;    Electrical Stimulation:    15     mins  49695 ( );    Timed Treatment:   26   mins   Total Treatment:     50   mins

## 2021-03-23 ENCOUNTER — OFFICE VISIT (OUTPATIENT)
Dept: ORTHOPEDIC SURGERY | Facility: CLINIC | Age: 46
End: 2021-03-23

## 2021-03-23 VITALS — TEMPERATURE: 97 F | BODY MASS INDEX: 36.37 KG/M2 | WEIGHT: 240 LBS | HEIGHT: 68 IN

## 2021-03-23 DIAGNOSIS — M25.561 CHRONIC PAIN OF RIGHT KNEE: Primary | ICD-10-CM

## 2021-03-23 DIAGNOSIS — G89.29 CHRONIC PAIN OF RIGHT KNEE: Primary | ICD-10-CM

## 2021-03-23 PROCEDURE — 99213 OFFICE O/P EST LOW 20 MIN: CPT | Performed by: ORTHOPAEDIC SURGERY

## 2021-03-23 RX ORDER — METHYLPREDNISOLONE 4 MG/1
TABLET ORAL
Qty: 21 TABLET | Refills: 0 | Status: SHIPPED | OUTPATIENT
Start: 2021-03-23 | End: 2021-07-20

## 2021-03-23 NOTE — PROGRESS NOTES
"Patient: Aamir White  YOB: 1975 45 y.o. female  Medical Record Number: [id number]    Chief Complaints:   Chief Complaint   Patient presents with   • Right Knee - Follow-up, Pain       History of Present Illness:Aamir White is a 45 y.o. female who presents for follow-up of right knee pain that is progressively worsening since her last visit back in December.  Patient reports the pain is now a 9 out of 10 and has severe pain with every step.  Patient states her pain feels like a sharp stabbing/raking sensation with every step.  Any type of movement exacerbates the pain, sitting down gives her some slight relief.  Patient reports that she is unable to completely place her leg in extension due to \"it feel like something is loose inside of her knee\".  Patient reports that she has difficulty with ambulation and instability.  Patient has been doing physical therapy for bilateral knee OA but at this point is unable to do therapy based off her current situation.  Patient is here today for further evaluation.    Allergies:   Allergies   Allergen Reactions   • Latex Hives, Itching and Swelling   • Morphine And Related    • Codeine Hallucinations   • Morphine Itching and Rash       Medications:   Current Outpatient Medications   Medication Sig Dispense Refill   • meloxicam (MOBIC) 15 MG tablet 1 PO Daily with food. 90 tablet 3   • methylPREDNISolone (MEDROL) 4 MG dose pack Use as directed by package instructions 21 tablet 0     No current facility-administered medications for this visit.         The following portions of the patient's history were reviewed and updated as appropriate: allergies, current medications, past family history, past medical history, past social history, past surgical history and problem list.    Review of Systems:   A 14 point review of systems was performed. All systems negative except pertinent positives/negative listed in HPI above    Physical Exam:   Vitals:    03/23/21 1548 " "  Temp: 97 °F (36.1 °C)   TempSrc: Temporal   Weight: 109 kg (240 lb)   Height: 171.5 cm (67.5\")       General: A and O x 3, ASA, NAD    SCLERA:    Normal    DENTITION:   Normal    Knee:  right    ALIGNMENT:     Slight valgus      GAIT:    Antalgic    SKIN:    No abnormality    RANGE OF MOTION:   15  -  115   DEG    STRENGTH:   4  / 5    LIGAMENTS:    No varus / valgus instability.   Negative  Lachman.    MENISCUS:     Negative   Asa       DISTAL PULSES:    Paplable    DISTAL SENSATION :   Intact    LYMPHATICS:     No   lymphadenopathy    OTHER:          - Positive   effusion      - Crepitance with ROM       Radiology:  Xrays 3views (ap,lateral, sunrise) taken previously demonstratingmild joint space narrowing and early arthritic changes.  No previous x-rays were reviewed in comparison.    Assessment/Plan: Right knee pain  Treatment options as well as imaging results were discussed with the patient.  Based off of patient's examination and painful symptoms she likely has a torn meniscus which is preventing her from fully extending her knee.  Patient has been doing conservative treatment options of physical therapy for bilateral knees and is unable to continue at this point based off her symptoms.  Therefore were going to order an MRI of her right knee for further evaluation.  We will call the patient back once we get her scan and reports and discuss further treatment options at this time.  We will also prescribe her a Medrol Dosepak to help decrease inflammation in her knee.    LUISA Cervantes  03/23/2021    This patient was seen in conjunction today with Dr. Jack Harrell.  Dr. Harrell agrees with the above-stated assessment and plan.    "

## 2021-04-06 ENCOUNTER — TELEPHONE (OUTPATIENT)
Dept: ORTHOPEDIC SURGERY | Facility: CLINIC | Age: 46
End: 2021-04-06

## 2021-04-06 ENCOUNTER — BULK ORDERING (OUTPATIENT)
Dept: CASE MANAGEMENT | Facility: OTHER | Age: 46
End: 2021-04-06

## 2021-04-06 ENCOUNTER — HOSPITAL ENCOUNTER (OUTPATIENT)
Dept: MRI IMAGING | Facility: HOSPITAL | Age: 46
Discharge: HOME | End: 2021-04-06
Admitting: NURSE PRACTITIONER

## 2021-04-06 DIAGNOSIS — G89.29 CHRONIC PAIN OF RIGHT KNEE: ICD-10-CM

## 2021-04-06 DIAGNOSIS — M25.561 CHRONIC PAIN OF RIGHT KNEE: ICD-10-CM

## 2021-04-06 DIAGNOSIS — Z23 IMMUNIZATION DUE: ICD-10-CM

## 2021-04-06 PROCEDURE — 73721 MRI JNT OF LWR EXTRE W/O DYE: CPT

## 2021-04-06 NOTE — TELEPHONE ENCOUNTER
I attempted to call and speak with the patient regards to her MRI results.  Patient did not answer and therefore I left her a message and instructed her that I would give her a call back or she can give us a call back at the office at her next convenience.

## 2021-04-07 NOTE — TELEPHONE ENCOUNTER
Patient needs a steroid injection to her knee.  If there is any room on [facility] Friday afternoon schedule this Friday can you please call the patient and schedule her for an intra-articular knee injection.

## 2021-04-08 ENCOUNTER — TELEPHONE (OUTPATIENT)
Dept: ORTHOPEDIC SURGERY | Facility: CLINIC | Age: 46
End: 2021-04-08

## 2021-04-08 NOTE — TELEPHONE ENCOUNTER
UNABLE TO WARM TRANSFER     Caller: ORIANA CHÁVEZ    Relationship to patient: SELF    Best call back number: [phone]    Patient is needing: PATIENT CALLED TO SEE ABOUT GETTING APPOINTMENT WORKED IN ON Friday 4/9/21 PER MARTIN HERNÁNDEZ FOR AN INJECTION. PATIENT STATED THAT SHE IS IN A LOT OF PAIN AND IS UPSET THAT SHE HASN'T HEARD ANYTHING. PLEASE CONTACT PATIENT AT EITHER NUMBERS LISTED ABOVE. [phone] IS HER WORK NUMBER IF SHE IS NOT REACHED ON HER MOBILE.

## 2021-04-09 ENCOUNTER — CLINICAL SUPPORT (OUTPATIENT)
Dept: ORTHOPEDIC SURGERY | Facility: CLINIC | Age: 46
End: 2021-04-09

## 2021-04-09 VITALS — HEIGHT: 68 IN | BODY MASS INDEX: 36.37 KG/M2 | WEIGHT: 240 LBS | TEMPERATURE: 96.6 F

## 2021-04-09 DIAGNOSIS — M17.0 PRIMARY OSTEOARTHRITIS OF BOTH KNEES: Primary | ICD-10-CM

## 2021-04-09 PROCEDURE — 20610 DRAIN/INJ JOINT/BURSA W/O US: CPT | Performed by: NURSE PRACTITIONER

## 2021-04-09 RX ORDER — METHYLPREDNISOLONE ACETATE 80 MG/ML
80 INJECTION, SUSPENSION INTRA-ARTICULAR; INTRALESIONAL; INTRAMUSCULAR; SOFT TISSUE
Status: COMPLETED | OUTPATIENT
Start: 2021-04-09 | End: 2021-04-09

## 2021-04-09 RX ADMIN — METHYLPREDNISOLONE ACETATE 80 MG: 80 INJECTION, SUSPENSION INTRA-ARTICULAR; INTRALESIONAL; INTRAMUSCULAR; SOFT TISSUE at 13:26

## 2021-04-09 NOTE — PROGRESS NOTES
4/9/2021    Aamir White is here today for worsening knee pain. Pt has undergone injection of the knee in the past with good resolution of symptoms. Pt is requesting a repeat injection.     KNEE Injection Procedure Note:    Large Joint Arthrocentesis: R knee  Date/Time: 4/9/2021 1:26 PM  Consent given by: patient  Site marked: site marked  Timeout: Immediately prior to procedure a time out was called to verify the correct patient, procedure, equipment, support staff and site/side marked as required   Supporting Documentation  Indications: pain and joint swelling   Procedure Details  Location: knee - R knee  Preparation: Patient was prepped and draped in the usual sterile fashion  Needle size: 22 G  Approach: anterolateral  Medications administered: 80 mg methylPREDNISolone acetate 80 MG/ML; 2 mL lidocaine (cardiac)  Patient tolerance: patient tolerated the procedure well with no immediate complications    Large Joint Arthrocentesis: L knee  Date/Time: 4/9/2021 1:26 PM  Consent given by: patient  Site marked: site marked  Timeout: Immediately prior to procedure a time out was called to verify the correct patient, procedure, equipment, support staff and site/side marked as required   Supporting Documentation  Indications: pain and joint swelling   Procedure Details  Location: knee - L knee  Preparation: Patient was prepped and draped in the usual sterile fashion  Needle size: 22 G  Approach: anterolateral  Medications administered: 80 mg methylPREDNISolone acetate 80 MG/ML; 2 mL lidocaine (cardiac)  Patient tolerance: patient tolerated the procedure well with no immediate complications          At the conclusion of the injection I discussed the importance of continued quad strengthening exercises on a daily basis. I will see the patient back if the symptoms should fail to improve or worsen.    Connie Feng, APRN  4/9/2021

## 2021-07-20 ENCOUNTER — OFFICE VISIT (OUTPATIENT)
Dept: ORTHOPEDIC SURGERY | Facility: CLINIC | Age: 46
End: 2021-07-20

## 2021-07-20 VITALS — WEIGHT: 240 LBS | HEIGHT: 67 IN | BODY MASS INDEX: 37.67 KG/M2 | TEMPERATURE: 98.2 F

## 2021-07-20 DIAGNOSIS — G89.29 CHRONIC PAIN OF RIGHT KNEE: Primary | ICD-10-CM

## 2021-07-20 DIAGNOSIS — M25.561 CHRONIC PAIN OF RIGHT KNEE: Primary | ICD-10-CM

## 2021-07-20 PROCEDURE — 20610 DRAIN/INJ JOINT/BURSA W/O US: CPT | Performed by: NURSE PRACTITIONER

## 2021-07-20 RX ORDER — METHYLPREDNISOLONE ACETATE 80 MG/ML
80 INJECTION, SUSPENSION INTRA-ARTICULAR; INTRALESIONAL; INTRAMUSCULAR; SOFT TISSUE
Status: COMPLETED | OUTPATIENT
Start: 2021-07-20 | End: 2021-07-20

## 2021-07-20 RX ORDER — MELOXICAM 15 MG/1
TABLET ORAL
Qty: 90 TABLET | Refills: 3 | Status: SHIPPED | OUTPATIENT
Start: 2021-07-20 | End: 2022-12-29

## 2021-07-20 RX ORDER — LIDOCAINE HYDROCHLORIDE 20 MG/ML
2 INJECTION, SOLUTION EPIDURAL; INFILTRATION; INTRACAUDAL; PERINEURAL
Status: COMPLETED | OUTPATIENT
Start: 2021-07-20 | End: 2021-07-20

## 2021-07-20 RX ADMIN — LIDOCAINE HYDROCHLORIDE 2 ML: 20 INJECTION, SOLUTION EPIDURAL; INFILTRATION; INTRACAUDAL; PERINEURAL at 15:55

## 2021-07-20 RX ADMIN — METHYLPREDNISOLONE ACETATE 80 MG: 80 INJECTION, SUSPENSION INTRA-ARTICULAR; INTRALESIONAL; INTRAMUSCULAR; SOFT TISSUE at 15:55

## 2021-07-30 ENCOUNTER — TELEPHONE (OUTPATIENT)
Dept: ORTHOPEDIC SURGERY | Facility: CLINIC | Age: 46
End: 2021-07-30

## 2021-07-30 NOTE — TELEPHONE ENCOUNTER
Caller:  PATIENT     Reason for call:  PATIENT IS CALLING TO RESCHEDULE INJ VISIT TYPE APPT. 10/28/21. DUE TO WORK. WOULD LIKE TO MOVE A DIFFERENT TIME THAT DAY    PLEASE ADVISE,     Caller# [phone]

## 2021-10-25 ENCOUNTER — TELEPHONE (OUTPATIENT)
Dept: ORTHOPEDIC SURGERY | Facility: CLINIC | Age: 46
End: 2021-10-25

## 2021-10-25 NOTE — TELEPHONE ENCOUNTER
Due to the patient work schedule she will not be able to make it on 11/01/2021.     Patient is asking if she can come in on 11/08/2021 instead. Please advise

## 2021-10-25 NOTE — TELEPHONE ENCOUNTER
Caller: ORIANA CHÁVEZ    Relationship to patient: SELF    Best call back number: [phone]    Type of visit: INJECTION     Requested date: THIS WED 10.27.21 OR NEXT Wednesday  11.3.21    If rescheduling, when is the original appointment: 11.1.21    Additional notes: PT NEEDING TO RESCHEDULE INJECTION - CAN CALL PT AFTER 1PM

## 2021-10-25 NOTE — TELEPHONE ENCOUNTER
Caller: Aamir White    Relationship to patient: Self    Best call back number: [phone]    Chief complaint: RESCHEDULE INJECTION    Type of visit: INJECTION    Requested date:    If rescheduling, when is the original appointment: 11/1

## 2021-11-08 ENCOUNTER — CLINICAL SUPPORT (OUTPATIENT)
Dept: ORTHOPEDIC SURGERY | Facility: CLINIC | Age: 46
End: 2021-11-08

## 2021-11-08 VITALS — HEIGHT: 68 IN | WEIGHT: 238 LBS | TEMPERATURE: 97.3 F | BODY MASS INDEX: 36.07 KG/M2

## 2021-11-08 DIAGNOSIS — M25.561 CHRONIC PAIN OF RIGHT KNEE: Primary | ICD-10-CM

## 2021-11-08 DIAGNOSIS — G89.29 CHRONIC PAIN OF RIGHT KNEE: Primary | ICD-10-CM

## 2021-11-08 PROCEDURE — 20610 DRAIN/INJ JOINT/BURSA W/O US: CPT | Performed by: NURSE PRACTITIONER

## 2021-11-08 RX ORDER — LIDOCAINE HYDROCHLORIDE 20 MG/ML
2 INJECTION, SOLUTION EPIDURAL; INFILTRATION; INTRACAUDAL; PERINEURAL
Status: COMPLETED | OUTPATIENT
Start: 2021-11-08 | End: 2021-11-08

## 2021-11-08 RX ORDER — METHYLPREDNISOLONE ACETATE 80 MG/ML
80 INJECTION, SUSPENSION INTRA-ARTICULAR; INTRALESIONAL; INTRAMUSCULAR; SOFT TISSUE
Status: COMPLETED | OUTPATIENT
Start: 2021-11-08 | End: 2021-11-08

## 2021-11-08 RX ADMIN — METHYLPREDNISOLONE ACETATE 80 MG: 80 INJECTION, SUSPENSION INTRA-ARTICULAR; INTRALESIONAL; INTRAMUSCULAR; SOFT TISSUE at 08:31

## 2021-11-08 RX ADMIN — LIDOCAINE HYDROCHLORIDE 2 ML: 20 INJECTION, SOLUTION EPIDURAL; INFILTRATION; INTRACAUDAL; PERINEURAL at 08:31

## 2021-11-08 NOTE — PROGRESS NOTES
11/8/2021    Aamir White is here today for worsening knee pain. Pt has undergone injection of the knee in the past with good resolution of symptoms. Pt is requesting a repeat injection.     KNEE Injection Procedure Note:    Large Joint Arthrocentesis: R knee  Date/Time: 11/8/2021 8:31 AM  Consent given by: patient  Site marked: site marked  Timeout: Immediately prior to procedure a time out was called to verify the correct patient, procedure, equipment, support staff and site/side marked as required   Supporting Documentation  Indications: pain and joint swelling   Procedure Details  Location: knee - R knee  Preparation: Patient was prepped and draped in the usual sterile fashion  Needle size: 22 G  Approach: anterolateral  Medications administered: 80 mg methylPREDNISolone acetate 80 MG/ML; 2 mL lidocaine PF 2% 2 %  Patient tolerance: patient tolerated the procedure well with no immediate complications          At the conclusion of the injection I discussed the importance of continued quad strengthening exercises on a daily basis. I will see the patient back if the symptoms should fail to improve or worsen.    Connie Feng, APRN  11/8/2021

## 2021-11-19 ENCOUNTER — OFFICE VISIT (OUTPATIENT)
Dept: FAMILY MEDICINE CLINIC | Facility: CLINIC | Age: 46
End: 2021-11-19

## 2021-11-19 VITALS
HEART RATE: 75 BPM | RESPIRATION RATE: 17 BRPM | SYSTOLIC BLOOD PRESSURE: 116 MMHG | TEMPERATURE: 97.5 F | WEIGHT: 232.9 LBS | DIASTOLIC BLOOD PRESSURE: 88 MMHG | BODY MASS INDEX: 35.3 KG/M2 | OXYGEN SATURATION: 99 % | HEIGHT: 68 IN

## 2021-11-19 DIAGNOSIS — Z00.00 ANNUAL PHYSICAL EXAM: Primary | ICD-10-CM

## 2021-11-19 DIAGNOSIS — E04.1 THYROID NODULE: ICD-10-CM

## 2021-11-19 DIAGNOSIS — E01.0 THYROMEGALY: ICD-10-CM

## 2021-11-19 PROCEDURE — 99396 PREV VISIT EST AGE 40-64: CPT | Performed by: FAMILY MEDICINE

## 2021-11-20 LAB
ALBUMIN SERPL-MCNC: 3.8 G/DL (ref 3.8–4.8)
ALBUMIN/GLOB SERPL: 1.5 {RATIO} (ref 1.2–2.2)
ALP SERPL-CCNC: 101 IU/L (ref 44–121)
ALT SERPL-CCNC: 8 IU/L (ref 0–32)
AST SERPL-CCNC: 14 IU/L (ref 0–40)
BASOPHILS # BLD AUTO: 0.1 X10E3/UL (ref 0–0.2)
BASOPHILS NFR BLD AUTO: 1 %
BILIRUB SERPL-MCNC: <0.2 MG/DL (ref 0–1.2)
BUN SERPL-MCNC: 11 MG/DL (ref 6–24)
BUN/CREAT SERPL: 15 (ref 9–23)
CALCIUM SERPL-MCNC: 8.8 MG/DL (ref 8.7–10.2)
CHLORIDE SERPL-SCNC: 106 MMOL/L (ref 96–106)
CO2 SERPL-SCNC: 24 MMOL/L (ref 20–29)
CREAT SERPL-MCNC: 0.71 MG/DL (ref 0.57–1)
EOSINOPHIL # BLD AUTO: 0.3 X10E3/UL (ref 0–0.4)
EOSINOPHIL NFR BLD AUTO: 5 %
ERYTHROCYTE [DISTWIDTH] IN BLOOD BY AUTOMATED COUNT: 13.6 % (ref 11.7–15.4)
GLOBULIN SER CALC-MCNC: 2.6 G/DL (ref 1.5–4.5)
GLUCOSE SERPL-MCNC: 81 MG/DL (ref 65–99)
HCT VFR BLD AUTO: 36.5 % (ref 34–46.6)
HGB BLD-MCNC: 11.9 G/DL (ref 11.1–15.9)
IMM GRANULOCYTES # BLD AUTO: 0 X10E3/UL (ref 0–0.1)
IMM GRANULOCYTES NFR BLD AUTO: 0 %
LYMPHOCYTES # BLD AUTO: 2.1 X10E3/UL (ref 0.7–3.1)
LYMPHOCYTES NFR BLD AUTO: 35 %
MCH RBC QN AUTO: 28.9 PG (ref 26.6–33)
MCHC RBC AUTO-ENTMCNC: 32.6 G/DL (ref 31.5–35.7)
MCV RBC AUTO: 89 FL (ref 79–97)
MONOCYTES # BLD AUTO: 0.9 X10E3/UL (ref 0.1–0.9)
MONOCYTES NFR BLD AUTO: 15 %
NEUTROPHILS # BLD AUTO: 2.6 X10E3/UL (ref 1.4–7)
NEUTROPHILS NFR BLD AUTO: 44 %
PLATELET # BLD AUTO: 310 X10E3/UL (ref 150–450)
POTASSIUM SERPL-SCNC: 4.4 MMOL/L (ref 3.5–5.2)
PROT SERPL-MCNC: 6.4 G/DL (ref 6–8.5)
RBC # BLD AUTO: 4.12 X10E6/UL (ref 3.77–5.28)
SODIUM SERPL-SCNC: 141 MMOL/L (ref 134–144)
T4 SERPL-MCNC: 7.5 UG/DL (ref 4.5–12)
TSH SERPL DL<=0.005 MIU/L-ACNC: 0.85 UIU/ML (ref 0.45–4.5)
WBC # BLD AUTO: 6 X10E3/UL (ref 3.4–10.8)

## 2021-12-17 ENCOUNTER — HOSPITAL ENCOUNTER (OUTPATIENT)
Dept: ULTRASOUND IMAGING | Facility: HOSPITAL | Age: 46
Discharge: HOME | End: 2021-12-17
Admitting: FAMILY MEDICINE

## 2021-12-17 PROCEDURE — 76536 US EXAM OF HEAD AND NECK: CPT

## 2022-02-11 ENCOUNTER — CLINICAL SUPPORT (OUTPATIENT)
Dept: ORTHOPEDIC SURGERY | Facility: CLINIC | Age: 47
End: 2022-02-11

## 2022-02-11 VITALS — WEIGHT: 250 LBS | TEMPERATURE: 97.3 F | HEIGHT: 68 IN | BODY MASS INDEX: 37.89 KG/M2

## 2022-02-11 DIAGNOSIS — G89.29 CHRONIC PAIN OF RIGHT KNEE: Primary | ICD-10-CM

## 2022-02-11 DIAGNOSIS — M25.561 CHRONIC PAIN OF RIGHT KNEE: Primary | ICD-10-CM

## 2022-02-11 PROCEDURE — 20610 DRAIN/INJ JOINT/BURSA W/O US: CPT | Performed by: NURSE PRACTITIONER

## 2022-02-11 RX ORDER — METHYLPREDNISOLONE ACETATE 80 MG/ML
80 INJECTION, SUSPENSION INTRA-ARTICULAR; INTRALESIONAL; INTRAMUSCULAR; SOFT TISSUE
Status: COMPLETED | OUTPATIENT
Start: 2022-02-11 | End: 2022-02-11

## 2022-02-11 RX ORDER — LIDOCAINE HYDROCHLORIDE 20 MG/ML
2 INJECTION, SOLUTION EPIDURAL; INFILTRATION; INTRACAUDAL; PERINEURAL
Status: COMPLETED | OUTPATIENT
Start: 2022-02-11 | End: 2022-02-11

## 2022-02-11 RX ADMIN — LIDOCAINE HYDROCHLORIDE 2 ML: 20 INJECTION, SOLUTION EPIDURAL; INFILTRATION; INTRACAUDAL; PERINEURAL at 13:23

## 2022-02-11 RX ADMIN — METHYLPREDNISOLONE ACETATE 80 MG: 80 INJECTION, SUSPENSION INTRA-ARTICULAR; INTRALESIONAL; INTRAMUSCULAR; SOFT TISSUE at 13:23

## 2022-02-11 NOTE — PROGRESS NOTES
2/11/2022    Aamir White is here today for worsening knee pain. Pt has undergone injection of the knee in the past with good resolution of symptoms. Pt is requesting a repeat injection.     KNEE Injection Procedure Note:    Large Joint Arthrocentesis: R knee  Date/Time: 2/11/2022 1:23 PM  Consent given by: patient  Site marked: site marked  Timeout: Immediately prior to procedure a time out was called to verify the correct patient, procedure, equipment, support staff and site/side marked as required   Supporting Documentation  Indications: pain and joint swelling   Procedure Details  Location: knee - R knee  Preparation: Patient was prepped and draped in the usual sterile fashion  Needle size: 22 G  Approach: anterolateral  Medications administered: 80 mg methylPREDNISolone acetate 80 MG/ML; 2 mL lidocaine PF 2% 2 %  Patient tolerance: patient tolerated the procedure well with no immediate complications          At the conclusion of the injection I discussed the importance of continued quad strengthening exercises on a daily basis. I will see the patient back if the symptoms should fail to improve or worsen.    Connie Feng, APRN  2/11/2022

## 2022-03-16 ENCOUNTER — OFFICE VISIT (OUTPATIENT)
Dept: FAMILY MEDICINE CLINIC | Facility: CLINIC | Age: 47
End: 2022-03-16

## 2022-03-16 VITALS
TEMPERATURE: 97.1 F | HEIGHT: 68 IN | HEART RATE: 88 BPM | OXYGEN SATURATION: 98 % | DIASTOLIC BLOOD PRESSURE: 82 MMHG | SYSTOLIC BLOOD PRESSURE: 132 MMHG | WEIGHT: 246.8 LBS | BODY MASS INDEX: 37.41 KG/M2

## 2022-03-16 DIAGNOSIS — Z02.83 ENCOUNTER FOR DRUG SCREENING: ICD-10-CM

## 2022-03-16 DIAGNOSIS — Z71.3 WEIGHT LOSS COUNSELING, ENCOUNTER FOR: Primary | ICD-10-CM

## 2022-03-16 LAB
POC AMPHETAMINES: NEGATIVE
POC BARBITURATES: NEGATIVE
POC BENZODIAZEPHINES: NEGATIVE
POC COCAINE: NEGATIVE
POC METHADONE: NEGATIVE
POC METHAMPHETAMINE SCREEN URINE: NEGATIVE
POC OPIATES: NEGATIVE
POC OXYCODONE: NEGATIVE
POC PHENCYCLIDINE: NEGATIVE
POC PROPOXYPHENE: NEGATIVE
POC THC: NEGATIVE
POC TRICYCLIC ANTIDEPRESSANTS: NEGATIVE

## 2022-03-16 PROCEDURE — 99214 OFFICE O/P EST MOD 30 MIN: CPT | Performed by: NURSE PRACTITIONER

## 2022-03-16 PROCEDURE — 80305 DRUG TEST PRSMV DIR OPT OBS: CPT | Performed by: NURSE PRACTITIONER

## 2022-03-16 PROCEDURE — 93000 ELECTROCARDIOGRAM COMPLETE: CPT | Performed by: NURSE PRACTITIONER

## 2022-03-16 RX ORDER — AMOXICILLIN AND CLAVULANATE POTASSIUM 875; 125 MG/1; MG/1
TABLET, FILM COATED ORAL
COMMUNITY
Start: 2022-03-04 | End: 2022-08-31

## 2022-03-16 RX ORDER — PHENTERMINE HYDROCHLORIDE 37.5 MG/1
37.5 CAPSULE ORAL EVERY MORNING
Qty: 30 CAPSULE | Refills: 0 | Status: SHIPPED | OUTPATIENT
Start: 2022-03-16 | End: 2022-04-15 | Stop reason: SDUPTHER

## 2022-03-16 NOTE — PATIENT INSTRUCTIONS
Follow up monthly    Use phentermine daily. Take early to prevent side effects including insomnia. Increase water intake while taking this medication. Monitor for constipation. Ok to take over the counter medication such as miralax or natural remedies if constipation occurs.     Limit caffeine intake.    Set small goals and celebrate small victories.     Call the office with any concerns.    [employer] anonymous is a great resource to help address emotional piece to eating

## 2022-03-16 NOTE — PROGRESS NOTES
"Chief Complaint  Weight Loss    Subjective          Aamir White presents to [facility]  Aamir presents for weight loss. States has never been this heavy, arthritis is getting worse over covid related to weight gain.     Has tried Keto, lost significant amount and maintained  Tried Yuniel, weight watchers, tried phentermine years ago.  was successful afterwards and able to maintain. Has tried diet and exercise, reducing carbs.       Objective   Vital Signs:   /82 (BP Location: Right arm, Patient Position: Sitting, Cuff Size: Adult)   Pulse 88   Temp 97.1 °F (36.2 °C) (Temporal)   Ht 171.5 cm (67.52\")   Wt 112 kg (246 lb 12.8 oz)   SpO2 98%   BMI 38.06 kg/m²     Physical Exam  Constitutional:       Appearance: Normal appearance.   Pulmonary:      Effort: Pulmonary effort is normal.   Skin:     General: Skin is warm and dry.   Neurological:      Mental Status: She is alert and oriented to person, place, and time.   Psychiatric:         Mood and Affect: Mood normal.        Result Review :            ECG 12 Lead    Date/Time: 3/16/2022 12:30 PM  Performed by: Carla Samayoa APRN  Authorized by: Carla Samayoa APRN   Previous ECG: no previous ECG available  Rhythm: sinus rhythm  Rate: normal  BPM: 71  Conduction: conduction normal  ST Segments: ST segments normal  T Waves: T waves normal  QRS axis: normal    Clinical impression: normal ECG              Assessment and Plan    Diagnoses and all orders for this visit:    1. Weight loss counseling, encounter for (Primary)  -     phentermine 37.5 MG capsule; Take 1 capsule by mouth Every Morning.  Dispense: 30 capsule; Refill: 0    2. Encounter for drug screening  -     POC Urine Drug Screen, Triage  -     phentermine 37.5 MG capsule; Take 1 capsule by mouth Every Morning.  Dispense: 30 capsule; Refill: 0      I spent 40 minutes caring for Aamir on this date of service. This time includes time spent " by me in the following activities:preparing for the visit, reviewing tests, performing a medically appropriate examination and/or evaluation , counseling and educating the patient/family/caregiver, ordering medications, tests, or procedures and documenting information in the medical record  Follow Up   No follow-ups on file.  Patient was given instructions and counseling regarding her condition or for health maintenance advice. Please see specific information pulled into the AVS if appropriate.     Obesity: bmi 38, weight 246  Recommend monthly follow ups for medication management and weight checks. Will monitor BP as well. BP is mildly increased in comparison to previous BP, at 132/82, will monitor moving forward.   EKG looks ok, no comparison on file, advised will repeat at next visit to ensure no changes.   Urine drug test is negative  She will follow up in one month  rx given for phentermine.   Information given for nutritional counseling  Discussed overeaters anonymous to help address underlying emotional eating

## 2022-04-01 ENCOUNTER — TELEPHONE (OUTPATIENT)
Dept: ORTHOPEDIC SURGERY | Facility: CLINIC | Age: 47
End: 2022-04-01

## 2022-04-01 NOTE — TELEPHONE ENCOUNTER
CAN YOU OVERRIDE ROSALINDA'S SCHEDULE FOR THIS PLEASE? SHE DIDN'T HAVE ANY APPOINTMENTS OPEN THAT I COULD SEE.

## 2022-04-01 NOTE — TELEPHONE ENCOUNTER
PATIENT CALLED BACK AFTER BEING CALLED TO R/S DUE TO [facility] BEING OUT OF THE OFFICE. HER NEXT AVAILABLE ISNT UNTIL 6/7. I WENT AHEAD AND SCHEDULED HER FOR THAT DAY AT 2:50 BUT PATIENT WANTS TO SEE IF THEY CAN WORK HER IN SOONER. I DID PUT HER ON THE WAIT LIST AS WELL. PLEASE ADVISE.

## 2022-04-15 ENCOUNTER — OFFICE VISIT (OUTPATIENT)
Dept: FAMILY MEDICINE CLINIC | Facility: CLINIC | Age: 47
End: 2022-04-15

## 2022-04-15 VITALS
DIASTOLIC BLOOD PRESSURE: 80 MMHG | RESPIRATION RATE: 18 BRPM | TEMPERATURE: 97.3 F | BODY MASS INDEX: 35.64 KG/M2 | HEIGHT: 68 IN | OXYGEN SATURATION: 99 % | WEIGHT: 235.2 LBS | HEART RATE: 94 BPM | SYSTOLIC BLOOD PRESSURE: 126 MMHG

## 2022-04-15 DIAGNOSIS — Z02.83 ENCOUNTER FOR DRUG SCREENING: ICD-10-CM

## 2022-04-15 DIAGNOSIS — Z71.3 WEIGHT LOSS COUNSELING, ENCOUNTER FOR: ICD-10-CM

## 2022-04-15 DIAGNOSIS — Z79.899 MEDICATION MANAGEMENT: Primary | ICD-10-CM

## 2022-04-15 PROCEDURE — 99213 OFFICE O/P EST LOW 20 MIN: CPT | Performed by: NURSE PRACTITIONER

## 2022-04-15 PROCEDURE — 93000 ELECTROCARDIOGRAM COMPLETE: CPT | Performed by: NURSE PRACTITIONER

## 2022-04-15 RX ORDER — PHENTERMINE HYDROCHLORIDE 37.5 MG/1
37.5 CAPSULE ORAL EVERY MORNING
Qty: 30 CAPSULE | Refills: 0 | Status: SHIPPED | OUTPATIENT
Start: 2022-04-15 | End: 2022-05-13 | Stop reason: SDUPTHER

## 2022-04-15 NOTE — PROGRESS NOTES
"Chief Complaint  Weight Loss    Subjective          Aamir White presents to [facility]  Aamir is a 46 year old female presenting for a one month follow up after starting phentermine for weight loss. She is very please that she has lost 10 pounds this month. She reports less snacking and mindless eating. She is eating lean meats like salmon and tuna. She is also incorporating more fruits and vegetables. She is exercising more and walks her dogs most days. She reports that she is tolerating the phentermine well except she is having some difficulty falling asleep at night. She does not have this problem if she takes the phentermine earlier in the morning. She denies any concerns.       Objective   Vital Signs:   /80 (BP Location: Left arm, Patient Position: Sitting, Cuff Size: Adult)   Pulse 94   Temp 97.3 °F (36.3 °C) (Temporal)   Resp 18   Ht 171.5 cm (67.52\")   Wt 107 kg (235 lb 3.2 oz)   SpO2 99%   BMI 36.27 kg/m²            Physical Exam  Constitutional:       Appearance: Normal appearance.   HENT:      Head: Normocephalic and atraumatic.      Right Ear: Tympanic membrane, ear canal and external ear normal.      Left Ear: Tympanic membrane, ear canal and external ear normal.      Nose: Nose normal.      Mouth/Throat:      Mouth: Mucous membranes are moist.      Pharynx: Oropharynx is clear. Uvula midline.   Eyes:      Conjunctiva/sclera: Conjunctivae normal.      Pupils: Pupils are equal, round, and reactive to light.   Cardiovascular:      Rate and Rhythm: Normal rate and regular rhythm.      Heart sounds: Normal heart sounds.   Pulmonary:      Effort: Pulmonary effort is normal.      Breath sounds: Normal breath sounds. No wheezing, rhonchi or rales.   Musculoskeletal:      Cervical back: Neck supple.   Lymphadenopathy:      Cervical: No cervical adenopathy.   Skin:     General: Skin is warm and dry.   Neurological:      General: No focal deficit present.      " Mental Status: She is alert and oriented to person, place, and time.   Psychiatric:         Mood and Affect: Mood normal.         Behavior: Behavior normal.        Result Review :            ECG 12 Lead    Date/Time: 4/15/2022 2:40 PM  Performed by: Carla Samayoa APRN  Authorized by: Carla Samayoa APRN   Comparison: compared with previous ECG from 3/16/2022  Similar to previous ECG  Rhythm: sinus rhythm  Rate: normal  BPM: 86  Conduction: conduction normal  T Waves: T waves normal  QRS axis: normal  Other: no other findings    Clinical impression: normal ECG  Comments: Medication management                Assessment and Plan    Diagnoses and all orders for this visit:    1. Medication management (Primary)  -     ECG 12 Lead    2. Encounter for drug screening  -     phentermine 37.5 MG capsule; Take 1 capsule by mouth Every Morning.  Dispense: 30 capsule; Refill: 0    3. Weight loss counseling, encounter for  -     phentermine 37.5 MG capsule; Take 1 capsule by mouth Every Morning.  Dispense: 30 capsule; Refill: 0  -     ECG 12 Lead        Follow Up   No follow-ups on file.  Patient was given instructions and counseling regarding her condition or for health maintenance advice. Please see specific information pulled into the AVS if appropriate.     I supervised care provided by the LUISA Student, Loretta Jaeger. We have discussed the case. I have reviewed  the note and agree with the plan of treatment. I personally interviewed the patient and examined the patient.    Patient is doing well on phentermine. She has lost 11 lbs. She is frustrated she has not lost more weight and discussed normal weight loss is 1-2 lbs per week. Also discussed focusing on how she feels as well as inches. She will begin measuring her waist, thighs, chest and arms monthly to help see improvement  Will continue phentermine, she will follow up in one month  BP is improved today, BMI 36.3  ekg shows no changes

## 2022-05-12 ENCOUNTER — CLINICAL SUPPORT (OUTPATIENT)
Dept: ORTHOPEDIC SURGERY | Facility: CLINIC | Age: 47
End: 2022-05-12

## 2022-05-12 VITALS — RESPIRATION RATE: 18 BRPM | BODY MASS INDEX: 35.46 KG/M2 | WEIGHT: 234 LBS | HEIGHT: 68 IN | TEMPERATURE: 98.2 F

## 2022-05-12 DIAGNOSIS — M17.0 PRIMARY OSTEOARTHRITIS OF BOTH KNEES: Primary | ICD-10-CM

## 2022-05-12 PROCEDURE — 20610 DRAIN/INJ JOINT/BURSA W/O US: CPT | Performed by: NURSE PRACTITIONER

## 2022-05-12 RX ORDER — METHYLPREDNISOLONE ACETATE 80 MG/ML
80 INJECTION, SUSPENSION INTRA-ARTICULAR; INTRALESIONAL; INTRAMUSCULAR; SOFT TISSUE
Status: COMPLETED | OUTPATIENT
Start: 2022-05-12 | End: 2022-05-12

## 2022-05-12 RX ORDER — LIDOCAINE HYDROCHLORIDE 20 MG/ML
2 INJECTION, SOLUTION EPIDURAL; INFILTRATION; INTRACAUDAL; PERINEURAL
Status: COMPLETED | OUTPATIENT
Start: 2022-05-12 | End: 2022-05-12

## 2022-05-12 RX ORDER — TERBINAFINE HYDROCHLORIDE 250 MG/1
TABLET ORAL
COMMUNITY
Start: 2022-02-24 | End: 2022-12-29

## 2022-05-12 RX ORDER — DIPHENOXYLATE HYDROCHLORIDE AND ATROPINE SULFATE 2.5; .025 MG/1; MG/1
TABLET ORAL EVERY 24 HOURS
COMMUNITY

## 2022-05-12 RX ADMIN — LIDOCAINE HYDROCHLORIDE 2 ML: 20 INJECTION, SOLUTION EPIDURAL; INFILTRATION; INTRACAUDAL; PERINEURAL at 16:55

## 2022-05-12 RX ADMIN — METHYLPREDNISOLONE ACETATE 80 MG: 80 INJECTION, SUSPENSION INTRA-ARTICULAR; INTRALESIONAL; INTRAMUSCULAR; SOFT TISSUE at 16:55

## 2022-05-13 ENCOUNTER — OFFICE VISIT (OUTPATIENT)
Dept: FAMILY MEDICINE CLINIC | Facility: CLINIC | Age: 47
End: 2022-05-13

## 2022-05-13 VITALS
TEMPERATURE: 96.5 F | OXYGEN SATURATION: 99 % | HEIGHT: 68 IN | SYSTOLIC BLOOD PRESSURE: 128 MMHG | BODY MASS INDEX: 34.65 KG/M2 | WEIGHT: 228.6 LBS | HEART RATE: 95 BPM | RESPIRATION RATE: 18 BRPM | DIASTOLIC BLOOD PRESSURE: 70 MMHG

## 2022-05-13 DIAGNOSIS — Z02.83 ENCOUNTER FOR DRUG SCREENING: ICD-10-CM

## 2022-05-13 DIAGNOSIS — Z71.3 WEIGHT LOSS COUNSELING, ENCOUNTER FOR: ICD-10-CM

## 2022-05-13 PROCEDURE — 99213 OFFICE O/P EST LOW 20 MIN: CPT | Performed by: NURSE PRACTITIONER

## 2022-05-13 RX ORDER — PHENTERMINE HYDROCHLORIDE 37.5 MG/1
37.5 CAPSULE ORAL EVERY MORNING
Qty: 30 CAPSULE | Refills: 0 | Status: SHIPPED | OUTPATIENT
Start: 2022-05-13 | End: 2022-08-31

## 2022-05-13 NOTE — PROGRESS NOTES
"Chief Complaint  Weight Check    Subjective          Aamir White presents to [facility]  Aamir presents for follow up weight loss management. She is taking phentermine as prescribed and tolerating this well. Her starting weight is 246 and today she is 228. She is tolerating this well and denies any complications with medication. Her blood pressure is well controlled at 128/70.       Objective   Vital Signs:  /70 (BP Location: Left arm, Patient Position: Sitting, Cuff Size: Adult)   Pulse 95   Temp 96.5 °F (35.8 °C) (Temporal)   Resp 18   Ht 171.5 cm (67.52\")   Wt 104 kg (228 lb 9.6 oz)   SpO2 99%   BMI 35.25 kg/m²     Class 2 Severe Obesity (BMI >=35 and <=39.9). Obesity-related health conditions include the following: none. Obesity is improving with treatment. BMI is is above average; BMI management plan is completed. We discussed portion control, increasing exercise and pharmacologic options including phentermine.      Physical Exam  Vitals reviewed.   Constitutional:       Appearance: Normal appearance.   Cardiovascular:      Rate and Rhythm: Normal rate and regular rhythm.      Heart sounds: No murmur heard.    No friction rub. No gallop.   Pulmonary:      Effort: Pulmonary effort is normal. No respiratory distress.      Breath sounds: Normal breath sounds. No wheezing, rhonchi or rales.   Skin:     General: Skin is warm and dry.   Neurological:      Mental Status: She is alert and oriented to person, place, and time.   Psychiatric:         Mood and Affect: Mood normal.        Result Review :                 Assessment and Plan    Diagnoses and all orders for this visit:    1. Encounter for drug screening  -     phentermine 37.5 MG capsule; Take 1 capsule by mouth Every Morning.  Dispense: 30 capsule; Refill: 0    2. Weight loss counseling, encounter for  -     phentermine 37.5 MG capsule; Take 1 capsule by mouth Every Morning.  Dispense: 30 capsule; Refill: " 0             Follow Up   Return in about 4 weeks (around 6/10/2022).  Patient was given instructions and counseling regarding her condition or for health maintenance advice. Please see specific information pulled into the AVS if appropriate.     Weight loss: doing well with phentermine, 18 lbs down, she was concerned she would not do as well this past month due to her birthday/mother's day. She is eating healthier snacks and increasing her physical activity.  She will follow up in one month  bp is well controlled  BMI down to 35.3

## 2022-08-26 ENCOUNTER — OFFICE VISIT (OUTPATIENT)
Dept: FAMILY MEDICINE CLINIC | Facility: CLINIC | Age: 47
End: 2022-08-26

## 2022-08-26 VITALS
OXYGEN SATURATION: 100 % | BODY MASS INDEX: 35.57 KG/M2 | RESPIRATION RATE: 16 BRPM | SYSTOLIC BLOOD PRESSURE: 124 MMHG | DIASTOLIC BLOOD PRESSURE: 78 MMHG | HEIGHT: 68 IN | HEART RATE: 83 BPM | TEMPERATURE: 97.7 F | WEIGHT: 234.7 LBS

## 2022-08-26 DIAGNOSIS — Z71.3 ENCOUNTER FOR WEIGHT LOSS COUNSELING: Primary | ICD-10-CM

## 2022-08-26 PROCEDURE — 99213 OFFICE O/P EST LOW 20 MIN: CPT | Performed by: NURSE PRACTITIONER

## 2022-08-31 NOTE — PROGRESS NOTES
"Chief Complaint  Weight Loss (Pt would like to discuss weight loss medication, has tried and failed dieting.previously on phentermine tried and failed )    Subjective        Aamir White presents to [facility]  Aamir presents for follow up weight loss. She was prescribed phentermine and tolerated that well. Since being off phentermine during the summer, she has increased 6 lbs. She was given a sample of ozempic to use and she is unsure how to use this medication. She also is a [employer] rep and is going to start their weight loss plan in the next month once the ozempic is completed.      Objective   Vital Signs:  /78 (BP Location: Right arm, Patient Position: Sitting, Cuff Size: Large Adult)   Pulse 83   Temp 97.7 °F (36.5 °C) (Temporal)   Resp 16   Ht 171.5 cm (67.52\")   Wt 106 kg (234 lb 11.2 oz)   SpO2 100%   BMI 36.20 kg/m²   Estimated body mass index is 36.2 kg/m² as calculated from the following:    Height as of this encounter: 171.5 cm (67.52\").    Weight as of this encounter: 106 kg (234 lb 11.2 oz).          Physical Exam  Constitutional:       General: She is not in acute distress.     Appearance: She is well-developed. She is not diaphoretic.   Cardiovascular:      Rate and Rhythm: Normal rate and regular rhythm.      Heart sounds: Normal heart sounds. No murmur heard.    No friction rub. No gallop.   Pulmonary:      Effort: Pulmonary effort is normal. No respiratory distress.      Breath sounds: Normal breath sounds. No wheezing or rales.   Abdominal:      General: Bowel sounds are normal. There is no distension.      Palpations: Abdomen is soft.      Tenderness: There is no abdominal tenderness.   Musculoskeletal:      Cervical back: Neck supple.   Skin:     General: Skin is warm and dry.   Neurological:      Mental Status: She is alert and oriented to person, place, and time.        Result Review :                Assessment and Plan   There are no " diagnoses linked to this encounter.         Follow Up   No follow-ups on file.  Patient was given instructions and counseling regarding her condition or for health maintenance advice. Please see specific information pulled into the AVS if appropriate.     Patient brought her ozempic to the office, instructions given for self adminstering medication, she returned demonstration and gave her first injection. She tolerated well and denies any concerns. She will complete this sample and then start [employer] to see if she obtain weight loss for a more natural approach

## 2022-09-08 ENCOUNTER — CLINICAL SUPPORT (OUTPATIENT)
Dept: ORTHOPEDIC SURGERY | Facility: CLINIC | Age: 47
End: 2022-09-08

## 2022-09-08 VITALS — HEIGHT: 68 IN | WEIGHT: 230 LBS | BODY MASS INDEX: 34.86 KG/M2 | TEMPERATURE: 96.3 F

## 2022-09-08 DIAGNOSIS — M17.0 PRIMARY OSTEOARTHRITIS OF BOTH KNEES: Primary | ICD-10-CM

## 2022-09-08 PROCEDURE — 20610 DRAIN/INJ JOINT/BURSA W/O US: CPT | Performed by: NURSE PRACTITIONER

## 2022-09-08 RX ORDER — METHYLPREDNISOLONE ACETATE 80 MG/ML
80 INJECTION, SUSPENSION INTRA-ARTICULAR; INTRALESIONAL; INTRAMUSCULAR; SOFT TISSUE
Status: COMPLETED | OUTPATIENT
Start: 2022-09-08 | End: 2022-09-08

## 2022-09-08 RX ADMIN — METHYLPREDNISOLONE ACETATE 80 MG: 80 INJECTION, SUSPENSION INTRA-ARTICULAR; INTRALESIONAL; INTRAMUSCULAR; SOFT TISSUE at 16:36

## 2022-09-08 NOTE — PROGRESS NOTES
9/8/2022    Aamir White is here today for worsening knee pain. Pt has undergone injection of the knee in the past with good resolution of symptoms. Pt is requesting a repeat injection.     KNEE Injection Procedure Note:    Large Joint Arthrocentesis: R knee  Date/Time: 9/8/2022 4:36 PM  Consent given by: patient  Site marked: site marked  Timeout: Immediately prior to procedure a time out was called to verify the correct patient, procedure, equipment, support staff and site/side marked as required   Supporting Documentation  Indications: pain   Procedure Details  Location: knee - R knee  Preparation: Patient was prepped and draped in the usual sterile fashion  Needle gauge: 21.  Approach: anterolateral  Medications administered: 80 mg methylPREDNISolone acetate 80 MG/ML; 2 mL lidocaine (cardiac)  Patient tolerance: patient tolerated the procedure well with no immediate complications    Large Joint Arthrocentesis: L knee  Date/Time: 9/8/2022 4:36 PM  Consent given by: patient  Site marked: site marked  Timeout: Immediately prior to procedure a time out was called to verify the correct patient, procedure, equipment, support staff and site/side marked as required   Supporting Documentation  Indications: pain   Procedure Details  Location: knee - L knee  Preparation: Patient was prepped and draped in the usual sterile fashion  Needle gauge: 21.  Approach: anterolateral  Medications administered: 80 mg methylPREDNISolone acetate 80 MG/ML; 2 mL lidocaine (cardiac)  Patient tolerance: patient tolerated the procedure well with no immediate complications            At the conclusion of the injection I discussed the importance of continued quad strengthening exercises on a daily basis. I will see the patient back if the symptoms should fail to improve or worsen.    Connie Feng, APRN

## 2022-09-30 ENCOUNTER — OFFICE VISIT (OUTPATIENT)
Dept: FAMILY MEDICINE CLINIC | Facility: CLINIC | Age: 47
End: 2022-09-30

## 2022-09-30 VITALS
RESPIRATION RATE: 18 BRPM | SYSTOLIC BLOOD PRESSURE: 138 MMHG | HEIGHT: 68 IN | DIASTOLIC BLOOD PRESSURE: 80 MMHG | OXYGEN SATURATION: 99 % | WEIGHT: 231 LBS | TEMPERATURE: 95.5 F | BODY MASS INDEX: 35.01 KG/M2 | HEART RATE: 62 BPM

## 2022-09-30 DIAGNOSIS — R73.09 ELEVATED GLUCOSE: Primary | ICD-10-CM

## 2022-09-30 DIAGNOSIS — E66.9 OBESITY (BMI 30-39.9): ICD-10-CM

## 2022-09-30 PROCEDURE — 99213 OFFICE O/P EST LOW 20 MIN: CPT | Performed by: NURSE PRACTITIONER

## 2022-09-30 RX ORDER — SEMAGLUTIDE 1.34 MG/ML
1 INJECTION, SOLUTION SUBCUTANEOUS WEEKLY
Qty: 3 ML | Refills: 2 | Status: SHIPPED | OUTPATIENT
Start: 2022-09-30 | End: 2022-12-29

## 2022-09-30 NOTE — PROGRESS NOTES
"Chief Complaint  Weight Loss    Subjective        Aamir White presents to [facility]  History of Present Illness  Aamir presents for follow up weight loss management. She has tried phentermine without lasting results. She started ozempic and notes she has not lost any lbs however she has lost inches. She is interested in continuing the ozempic. She does report constipation with the medication.      Objective   Vital Signs:  /80 (BP Location: Left arm, Patient Position: Sitting, Cuff Size: Adult)   Pulse 62   Temp 95.5 °F (35.3 °C) (Temporal)   Resp 18   Ht 171.5 cm (67.52\")   Wt 105 kg (231 lb)   SpO2 99%   BMI 35.62 kg/m²   Estimated body mass index is 35.62 kg/m² as calculated from the following:    Height as of this encounter: 171.5 cm (67.52\").    Weight as of this encounter: 105 kg (231 lb).          Physical Exam  Constitutional:       General: She is not in acute distress.     Appearance: She is well-developed. She is not diaphoretic.   Skin:     General: Skin is warm and dry.   Neurological:      Mental Status: She is alert and oriented to person, place, and time.   Psychiatric:         Mood and Affect: Mood normal.        Result Review :                Assessment and Plan   Diagnoses and all orders for this visit:    1. Elevated glucose (Primary)  -     Semaglutide, 1 MG/DOSE, (Ozempic, 1 MG/DOSE,) 4 MG/3ML solution pen-injector; Inject 1 mg under the skin into the appropriate area as directed 1 (One) Time Per Week.  Dispense: 3 mL; Refill: 2    2. Obesity (BMI 30-39.9)  -     Semaglutide, 1 MG/DOSE, (Ozempic, 1 MG/DOSE,) 4 MG/3ML solution pen-injector; Inject 1 mg under the skin into the appropriate area as directed 1 (One) Time Per Week.  Dispense: 3 mL; Refill: 2             Follow Up   No follow-ups on file.  Patient was given instructions and counseling regarding her condition or for health maintenance advice. Please see specific information pulled " into the AVS if appropriate.

## 2022-10-19 ENCOUNTER — TELEPHONE (OUTPATIENT)
Dept: FAMILY MEDICINE CLINIC | Facility: CLINIC | Age: 47
End: 2022-10-19

## 2022-12-06 ENCOUNTER — TELEPHONE (OUTPATIENT)
Dept: FAMILY MEDICINE CLINIC | Facility: CLINIC | Age: 47
End: 2022-12-06

## 2022-12-06 NOTE — TELEPHONE ENCOUNTER
Spoke to patient and she is asking if she needs to make a appt or can you switch back to phentermine, also she is checking if her ins will cover We[employer] or saxenda, and suppose to get  Back to us.

## 2022-12-09 ENCOUNTER — CLINICAL SUPPORT (OUTPATIENT)
Dept: ORTHOPEDIC SURGERY | Facility: CLINIC | Age: 47
End: 2022-12-09

## 2022-12-09 VITALS — HEIGHT: 67 IN | BODY MASS INDEX: 37.48 KG/M2 | TEMPERATURE: 97.6 F | WEIGHT: 238.8 LBS

## 2022-12-09 DIAGNOSIS — M17.0 PRIMARY OSTEOARTHRITIS OF BOTH KNEES: Primary | ICD-10-CM

## 2022-12-09 PROCEDURE — 20610 DRAIN/INJ JOINT/BURSA W/O US: CPT | Performed by: NURSE PRACTITIONER

## 2022-12-09 PROCEDURE — 73562 X-RAY EXAM OF KNEE 3: CPT | Performed by: NURSE PRACTITIONER

## 2022-12-09 RX ORDER — METHYLPREDNISOLONE ACETATE 80 MG/ML
80 INJECTION, SUSPENSION INTRA-ARTICULAR; INTRALESIONAL; INTRAMUSCULAR; SOFT TISSUE
Status: COMPLETED | OUTPATIENT
Start: 2022-12-09 | End: 2022-12-09

## 2022-12-09 RX ORDER — LIDOCAINE HYDROCHLORIDE 10 MG/ML
2 INJECTION, SOLUTION EPIDURAL; INFILTRATION; INTRACAUDAL; PERINEURAL
Status: COMPLETED | OUTPATIENT
Start: 2022-12-09 | End: 2022-12-09

## 2022-12-09 RX ADMIN — METHYLPREDNISOLONE ACETATE 80 MG: 80 INJECTION, SUSPENSION INTRA-ARTICULAR; INTRALESIONAL; INTRAMUSCULAR; SOFT TISSUE at 15:20

## 2022-12-09 RX ADMIN — LIDOCAINE HYDROCHLORIDE 2 ML: 10 INJECTION, SOLUTION EPIDURAL; INFILTRATION; INTRACAUDAL; PERINEURAL at 15:20

## 2022-12-09 NOTE — PROGRESS NOTES
12/9/2022    Aamir White is here today for worsening knee pain. Pt has undergone injection of the knee in the past with good resolution of symptoms. Pt is requesting a repeat injection.     KNEE Injection Procedure Note:    Large Joint Arthrocentesis: R knee  Date/Time: 12/9/2022 3:20 PM  Consent given by: patient  Site marked: site marked  Timeout: Immediately prior to procedure a time out was called to verify the correct patient, procedure, equipment, support staff and site/side marked as required   Supporting Documentation  Indications: pain   Procedure Details  Location: knee - R knee  Preparation: Patient was prepped and draped in the usual sterile fashion  Needle gauge: 21.  Approach: anterolateral  Medications administered: 80 mg methylPREDNISolone acetate 80 MG/ML; 2 mL lidocaine PF 1% 1 %  Patient tolerance: patient tolerated the procedure well with no immediate complications    Large Joint Arthrocentesis: L knee  Date/Time: 12/9/2022 3:20 PM  Consent given by: patient  Site marked: site marked  Timeout: Immediately prior to procedure a time out was called to verify the correct patient, procedure, equipment, support staff and site/side marked as required   Supporting Documentation  Indications: pain   Procedure Details  Location: knee - L knee  Preparation: Patient was prepped and draped in the usual sterile fashion  Needle gauge: 21.  Approach: anterolateral  Medications administered: 80 mg methylPREDNISolone acetate 80 MG/ML; 2 mL lidocaine PF 1% 1 %  Patient tolerance: patient tolerated the procedure well with no immediate complications          At the conclusion of the injection I discussed the importance of continued quad strengthening exercises on a daily basis. I will see the patient back if the symptoms should fail to improve or worsen.    3 views of bilateral knees were ordered and reviewed today secondary to pain show bone-on-bone end-stage osteoarthritis.  Compared to views are  unchanged    Connie Feng, APRN

## 2022-12-12 NOTE — TELEPHONE ENCOUNTER
Spoke to patient and she is asking does she need visit first or can you call her initial one then schedule monthly follow up from there. Can you advise

## 2022-12-29 ENCOUNTER — OFFICE VISIT (OUTPATIENT)
Dept: FAMILY MEDICINE CLINIC | Facility: CLINIC | Age: 47
End: 2022-12-29

## 2022-12-29 VITALS
RESPIRATION RATE: 12 BRPM | BODY MASS INDEX: 38.06 KG/M2 | SYSTOLIC BLOOD PRESSURE: 126 MMHG | DIASTOLIC BLOOD PRESSURE: 68 MMHG | HEIGHT: 67 IN | OXYGEN SATURATION: 99 % | WEIGHT: 242.5 LBS | TEMPERATURE: 97.1 F | HEART RATE: 84 BPM

## 2022-12-29 DIAGNOSIS — Z71.3 ENCOUNTER FOR WEIGHT LOSS COUNSELING: Primary | ICD-10-CM

## 2022-12-29 PROCEDURE — 99213 OFFICE O/P EST LOW 20 MIN: CPT | Performed by: NURSE PRACTITIONER

## 2022-12-29 RX ORDER — PHENTERMINE HYDROCHLORIDE 37.5 MG/1
37.5 TABLET ORAL
Qty: 30 TABLET | Refills: 0 | Status: SHIPPED | OUTPATIENT
Start: 2022-12-29 | End: 2023-02-01 | Stop reason: SDUPTHER

## 2022-12-29 NOTE — PROGRESS NOTES
"Chief Complaint  Weight Loss    Subjective        Aamir White presents to [facility]  History of Present Illness    Aamir White is a 47-year-old female who presents for weight loss management. She was previously prescribed phentermine. She did lose weight; however, she has been off of that for several months.           The patient states when her weight increases, she experiences night sweats throughout her body. She mentions if her metabolism is slowing down. She adds she feels like she is starting menopause or perimenopause. The patient states her  is a , and is away constantly. She describes around this time she eats, and gets extremely depressed. She reports she is going to start singing in the choir at [employer], and look into a hobby. She reports she likes how phentermine gives her energy and keeps her mental psyche up.            She notes she will have to wake up super early to take the phentermine in order to be able lay down to sleep at night. She inquiries about intermittent fasting.            She reports she obtains cortisone injections. She states she discontinued the meloxicam, and Lamisil. She notes she utilizes essential oils for pain.           She states her health insurance company will be changing in January.          Objective   Vital Signs:  /68   Pulse 84   Temp 97.1 °F (36.2 °C) (Infrared)   Resp 12   Ht 170.2 cm (67\")   Wt 110 kg (242 lb 8 oz)   SpO2 99%   BMI 37.98 kg/m²   Estimated body mass index is 37.98 kg/m² as calculated from the following:    Height as of this encounter: 170.2 cm (67\").    Weight as of this encounter: 110 kg (242 lb 8 oz).          Physical Exam  Constitutional:       Appearance: Normal appearance.   Skin:     General: Skin is warm and dry.   Neurological:      Mental Status: She is alert.   Psychiatric:         Mood and Affect: Mood normal.        Result Review :                Assessment and " Plan   Diagnoses and all orders for this visit:    1. Encounter for weight loss counseling (Primary)  -     phentermine (Adipex-P) 37.5 MG tablet; Take 1 tablet by mouth Every Morning Before Breakfast.  Dispense: 30 tablet; Refill: 0      Assessment & Plan     1. Weight loss   The patient will start phentermine tablet 37.5 mg.     She will restart the phentermine tablet 37.5 mg. She was instructed to start with a half tab or use half tab on the weekends if she does not wake up as early.    Discussed considering Topamax 25 mg in the future if she is not seeing the results from the phentermine tablet 37.5 mg.    Discussed potential side effects with this medication as well. The patient will follow-up in 1 month.          Follow Up   No follow-ups on file.  Patient was given instructions and counseling regarding her condition or for health maintenance advice. Please see specific information pulled into the AVS if appropriate.       Transcribed from ambient dictation for LUISA Ruiz by Lucian Hess.  12/29/22   11:29 EST    Patient or patient representative verbalized consent to the visit recording.  I have personally performed the services described in this document as transcribed by the above individual, and it is both accurate and complete.

## 2023-02-01 ENCOUNTER — OFFICE VISIT (OUTPATIENT)
Dept: FAMILY MEDICINE CLINIC | Facility: CLINIC | Age: 48
End: 2023-02-01
Payer: COMMERCIAL

## 2023-02-01 VITALS
SYSTOLIC BLOOD PRESSURE: 118 MMHG | TEMPERATURE: 97.1 F | RESPIRATION RATE: 18 BRPM | OXYGEN SATURATION: 99 % | HEIGHT: 67 IN | WEIGHT: 226.7 LBS | HEART RATE: 83 BPM | BODY MASS INDEX: 35.58 KG/M2 | DIASTOLIC BLOOD PRESSURE: 70 MMHG

## 2023-02-01 DIAGNOSIS — Z71.3 ENCOUNTER FOR WEIGHT LOSS COUNSELING: Primary | ICD-10-CM

## 2023-02-01 PROCEDURE — 99213 OFFICE O/P EST LOW 20 MIN: CPT | Performed by: NURSE PRACTITIONER

## 2023-02-01 RX ORDER — PHENTERMINE HYDROCHLORIDE 37.5 MG/1
37.5 TABLET ORAL
Qty: 30 TABLET | Refills: 0 | Status: SHIPPED | OUTPATIENT
Start: 2023-02-01

## 2023-02-01 RX ORDER — VIT C/B6/B5/MAGNESIUM/HERB 173 50-5-6-5MG
CAPSULE ORAL
COMMUNITY

## 2023-02-01 NOTE — PROGRESS NOTES
"Chief Complaint  Weight Loss    Subjective        Aamir White presents to [facility]  History of Present Illness    Aamir White is a 47-year-old female who presents for weight loss management.    The patient has lost weight. She states that she is juicing and making protein shakes. She states that she feels improved. She weighs 226 pounds, and she was previously 242 pounds. She currently takes phentermine 37.5 mg. The patient has done well on phentermine previously. The patient states this is her second month. The patient experiences sleeping issues. She takes her medication at 6:00 AM. The patient does not take the medication when she wants to rest. She also intermittently takes 0.5 tablet. Her weight goal is approximately 185 pounds.    The patient's BMI was 38 kg/m2 at her last appointment. It is now 35.5 kg/m2.    Objective   Vital Signs:  /70 (BP Location: Right arm, Patient Position: Sitting, Cuff Size: Adult)   Pulse 83   Temp 97.1 °F (36.2 °C) (Temporal)   Resp 18   Ht 170.2 cm (67.01\")   Wt 103 kg (226 lb 11.2 oz)   SpO2 99%   BMI 35.50 kg/m²   Estimated body mass index is 35.5 kg/m² as calculated from the following:    Height as of this encounter: 170.2 cm (67.01\").    Weight as of this encounter: 103 kg (226 lb 11.2 oz).          Physical Exam  Vitals reviewed.   Constitutional:       Appearance: Normal appearance.   Skin:     General: Skin is warm and dry.   Neurological:      Mental Status: She is alert and oriented to person, place, and time.   Psychiatric:         Mood and Affect: Mood normal.        Result Review :                  Assessment and Plan   Diagnoses and all orders for this visit:    1. Encounter for weight loss counseling (Primary)  -     phentermine (Adipex-P) 37.5 MG tablet; Take 1 tablet by mouth Every Morning Before Breakfast.  Dispense: 30 tablet; Refill: 0      1. Encounter for weight loss counseling  - The patient's phentermine " prescription refill was sent.  - Her blood pressure is 118/70 mmHg.       Follow Up   No follow-ups on file.  Patient was given instructions and counseling regarding her condition or for health maintenance advice. Please see specific information pulled into the AVS if appropriate.       Transcribed from ambient dictation for LUISA Ruiz by Flora Mcclain.  02/01/23   15:11 EST    Patient or patient representative verbalized consent to the visit recording.  I have personally performed the services described in this document as transcribed by the above individual, and it is both accurate and complete.

## 2023-02-15 ENCOUNTER — OFFICE VISIT (OUTPATIENT)
Dept: FAMILY MEDICINE CLINIC | Facility: CLINIC | Age: 48
End: 2023-02-15
Payer: COMMERCIAL

## 2023-02-15 VITALS
OXYGEN SATURATION: 98 % | TEMPERATURE: 98.2 F | HEART RATE: 91 BPM | HEIGHT: 68 IN | BODY MASS INDEX: 34.17 KG/M2 | DIASTOLIC BLOOD PRESSURE: 84 MMHG | WEIGHT: 225.5 LBS | SYSTOLIC BLOOD PRESSURE: 140 MMHG

## 2023-02-15 DIAGNOSIS — U07.1 COVID: Primary | ICD-10-CM

## 2023-02-15 LAB
EXPIRATION DATE: ABNORMAL
FLUAV AG UPPER RESP QL IA.RAPID: NOT DETECTED
FLUBV AG UPPER RESP QL IA.RAPID: NOT DETECTED
INTERNAL CONTROL: ABNORMAL
Lab: ABNORMAL
SARS-COV-2 AG UPPER RESP QL IA.RAPID: DETECTED

## 2023-02-15 PROCEDURE — 99214 OFFICE O/P EST MOD 30 MIN: CPT | Performed by: NURSE PRACTITIONER

## 2023-02-15 PROCEDURE — 87428 SARSCOV & INF VIR A&B AG IA: CPT | Performed by: NURSE PRACTITIONER

## 2023-02-15 NOTE — PROGRESS NOTES
"Chief Complaint  Generalized Body Aches (Neg at home covid on 12/12/23), Chills, Fever, and Laryngitis    Subjective        Aamir White presents to [facility]  History of Present Illness  Very pleasant patient  Unfortunately she has body aches fatigue cough little congestion not much  Really started yesterday  Some symptoms a day or 2 prior  Home COVID test -2 days ago  Positive COVID test today she has had a total of 3 COVID vaccines in the past.  She has no diabetes  No heart disease no asthma she has a borderline risk factor with a BMI a little under 35,  She is quite uncomfortable but without severe symptoms of shortness of breath lethargy etc.  Chills  Associated symptoms include chills and a fever.   Fever     Laryngitis  Associated symptoms include chills and a fever.       Objective   Vital Signs:  /84   Pulse 91   Temp 98.2 °F (36.8 °C) (Temporal)   Ht 171.5 cm (67.52\")   Wt 102 kg (225 lb 8 oz)   SpO2 98%   BMI 34.78 kg/m²   Estimated body mass index is 34.78 kg/m² as calculated from the following:    Height as of this encounter: 171.5 cm (67.52\").    Weight as of this encounter: 102 kg (225 lb 8 oz).          Physical Exam  Vitals reviewed.   Constitutional:       General: She is not in acute distress.     Appearance: Normal appearance. She is well-developed. She is ill-appearing. She is not toxic-appearing or diaphoretic.      Comments: Appears nontoxic uncomfortable but nontoxic she is bundled up   HENT:      Head: Normocephalic.      Nose: Nose normal.   Eyes:      General: No scleral icterus.     Conjunctiva/sclera: Conjunctivae normal.      Pupils: Pupils are equal, round, and reactive to light.   Neck:      Thyroid: No thyromegaly.      Vascular: No JVD.   Cardiovascular:      Rate and Rhythm: Normal rate and regular rhythm.      Heart sounds: Normal heart sounds. No murmur heard.    No friction rub. No gallop.   Pulmonary:      Effort: Pulmonary " effort is normal. No respiratory distress.      Breath sounds: Normal breath sounds. No stridor. No wheezing or rales.      Comments: Clear unlabored respirations less than 20 able to speak full sentence without dyspnea  Abdominal:      General: Bowel sounds are normal. There is no distension.      Palpations: Abdomen is soft.      Tenderness: There is no abdominal tenderness.      Comments: No hepatosplenomegaly, no ascites,   Musculoskeletal:         General: No tenderness.      Cervical back: Neck supple.   Lymphadenopathy:      Cervical: No cervical adenopathy.   Skin:     General: Skin is warm and dry.      Capillary Refill: Capillary refill takes less than 2 seconds.      Findings: No erythema or rash.   Neurological:      General: No focal deficit present.      Mental Status: She is alert and oriented to person, place, and time.      Deep Tendon Reflexes: Reflexes are normal and symmetric.   Psychiatric:         Behavior: Behavior normal.         Thought Content: Thought content normal.         Judgment: Judgment normal.        Result Review :                Assessment and Plan   Diagnoses and all orders for this visit:    1. COVID (Primary)    Other orders  -     Nirmatrelvir&Ritonavir 300/100 (PAXLOVID) 20 x 150 MG & 10 x 100MG tablet therapy pack tablet; Take 3 tablets by mouth 2 (Two) Times a Day for 5 days. Follow-up pharmacist/package instructions  Dispense: 30 tablet; Refill: 0             Follow Up   Return Return to work Monday please.  Patient was given instructions and counseling regarding her condition or for health maintenance advice. Please see specific information pulled into the AVS if appropriate.     Patient Instructions   Discharge instructions  Hydration hydration hydration  Treat fever or fever equivalents which include headache body aches myalgia arthralgia with ibuprofen 800  3 times a day the next 2 to 3 days then as needed  Take with food and extra water to protect stomach and  kidneys    Avoid too much blankets and bundling up do not sleep excessively make sure you are up pushing fluids    High uncontrolled fever lethargy weakness chest pain increasing shortness of breath worsening symptoms oxygen less than 90% or feeling worse and decreasing oxygen less than 95% emergency room    Update your condition Monday please  As long as you have been without fever for 24 hours and feeling better  Per CDC okay to return to community

## 2023-02-15 NOTE — PATIENT INSTRUCTIONS
Discharge instructions  Hydration hydration hydration  Treat fever or fever equivalents which include headache body aches myalgia arthralgia with ibuprofen 800  3 times a day the next 2 to 3 days then as needed  Take with food and extra water to protect stomach and kidneys    Avoid too much blankets and bundling up do not sleep excessively make sure you are up pushing fluids    High uncontrolled fever lethargy weakness chest pain increasing shortness of breath worsening symptoms oxygen less than 90% or feeling worse and decreasing oxygen less than 95% emergency room    Update your condition Monday please  As long as you have been without fever for 24 hours and feeling better  Per CDC okay to return to [facility]

## 2023-03-03 ENCOUNTER — OFFICE VISIT (OUTPATIENT)
Dept: FAMILY MEDICINE CLINIC | Facility: CLINIC | Age: 48
End: 2023-03-03
Payer: COMMERCIAL

## 2023-03-03 VITALS
BODY MASS INDEX: 34.86 KG/M2 | DIASTOLIC BLOOD PRESSURE: 81 MMHG | WEIGHT: 230 LBS | SYSTOLIC BLOOD PRESSURE: 136 MMHG | TEMPERATURE: 97.7 F | HEIGHT: 68 IN | HEART RATE: 85 BPM | OXYGEN SATURATION: 100 %

## 2023-03-03 DIAGNOSIS — R05.3 POST-COVID CHRONIC COUGH: Primary | ICD-10-CM

## 2023-03-03 DIAGNOSIS — U09.9 POST-COVID CHRONIC COUGH: Primary | ICD-10-CM

## 2023-03-03 LAB
EXPIRATION DATE: NORMAL
INTERNAL CONTROL: NORMAL
Lab: NORMAL
S PYO AG THROAT QL: NEGATIVE

## 2023-03-03 PROCEDURE — 87880 STREP A ASSAY W/OPTIC: CPT | Performed by: NURSE PRACTITIONER

## 2023-03-03 PROCEDURE — 99213 OFFICE O/P EST LOW 20 MIN: CPT | Performed by: NURSE PRACTITIONER

## 2023-03-03 RX ORDER — DEXTROMETHORPHAN HYDROBROMIDE AND PROMETHAZINE HYDROCHLORIDE 15; 6.25 MG/5ML; MG/5ML
5 SYRUP ORAL 4 TIMES DAILY PRN
Qty: 118 ML | Refills: 0 | Status: SHIPPED | OUTPATIENT
Start: 2023-03-03

## 2023-03-03 RX ORDER — METHYLPREDNISOLONE 4 MG/1
TABLET ORAL
Qty: 1 EACH | Refills: 0 | Status: SHIPPED | OUTPATIENT
Start: 2023-03-03

## 2023-03-03 NOTE — PROGRESS NOTES
"Chief Complaint  Sore Throat (C/o sore throat, swollen glands, runny/stuffy nose, pressure in ears, x 2-3 days )    Subjective        Aamir White presents to [facility]  History of Present Illness patient returns today with complaints of sore throat, swollen glands, hoarseness, runny stuffy nose and some pressure in her ears for the last 2 to 3 days, but never really has returned to her baseline after having COVID approximately 2 weeks ago.  She took Paxlovid and did improve but continues to intermittently lose her voice.  She has a singing engagement coming up and is concerned about the raspiness of her voice.    No current fever, chills, dyspnea.      Objective   Vital Signs:  /81   Pulse 85   Temp 97.7 °F (36.5 °C)   Ht 171.5 cm (67.5\")   Wt 104 kg (230 lb)   SpO2 100%   BMI 35.49 kg/m²   Estimated body mass index is 35.49 kg/m² as calculated from the following:    Height as of this encounter: 171.5 cm (67.5\").    Weight as of this encounter: 104 kg (230 lb).             Physical Exam  Vitals and nursing note reviewed.   Constitutional:       General: She is not in acute distress.     Appearance: She is well-developed. She is not ill-appearing or diaphoretic.   HENT:      Head: Normocephalic and atraumatic.      Right Ear: Ear canal and external ear normal.      Left Ear: Ear canal and external ear normal.      Ears:      Comments: Bilateral TMs a little dull, no erythema     Mouth/Throat:      Mouth: Mucous membranes are moist.      Pharynx: Posterior oropharyngeal erythema present. No oropharyngeal exudate.   Eyes:      General: No scleral icterus.        Right eye: No discharge.         Left eye: No discharge.      Conjunctiva/sclera: Conjunctivae normal.   Cardiovascular:      Rate and Rhythm: Normal rate and regular rhythm.      Heart sounds: Normal heart sounds.   Pulmonary:      Effort: Pulmonary effort is normal.      Breath sounds: Normal breath sounds. No " wheezing.   Abdominal:      General: Bowel sounds are normal.      Palpations: Abdomen is soft.      Tenderness: There is no abdominal tenderness.   Musculoskeletal:         General: No deformity.      Cervical back: Neck supple.      Comments: Gait smooth and steady   Lymphadenopathy:      Cervical: Cervical adenopathy (Bilateral tonsillar) present.   Skin:     General: Skin is warm and dry.   Neurological:      Mental Status: She is alert and oriented to person, place, and time.   Psychiatric:         Mood and Affect: Mood normal.         Behavior: Behavior normal.        Result Review :                   Assessment and Plan   Diagnoses and all orders for this visit:    1. Post-COVID chronic cough (Primary)  -     methylPREDNISolone (MEDROL) 4 MG dose pack; Take as directed on package instructions.  Dispense: 1 each; Refill: 0  -     promethazine-dextromethorphan (PROMETHAZINE-DM) 6.25-15 MG/5ML syrup; Take 5 mL by mouth 4 (Four) Times a Day As Needed for Cough.  Dispense: 118 mL; Refill: 0  -     POCT rapid strep A    Likely post-COVID syndrome with allergies also contributing to her symptoms.  Discussed need for voice rest.  Antibiotics will not hoarseness.  We will try Medrol Dosepak.  Side effects and dosing instructions discussed.  Promethazine DM as needed for cough.  Continue allergy control.  Follow-up with PCP if no better or worsen.         Follow Up   No follow-ups on file.  Patient was given instructions and counseling regarding her condition or for health maintenance advice. Please see specific information pulled into the AVS if appropriate.

## 2023-03-10 ENCOUNTER — CLINICAL SUPPORT (OUTPATIENT)
Dept: ORTHOPEDIC SURGERY | Facility: CLINIC | Age: 48
End: 2023-03-10
Payer: COMMERCIAL

## 2023-03-10 VITALS — WEIGHT: 229 LBS | HEIGHT: 68 IN | TEMPERATURE: 96.5 F | BODY MASS INDEX: 34.71 KG/M2

## 2023-03-10 DIAGNOSIS — M17.0 PRIMARY OSTEOARTHRITIS OF BOTH KNEES: Primary | ICD-10-CM

## 2023-03-10 PROCEDURE — 20610 DRAIN/INJ JOINT/BURSA W/O US: CPT | Performed by: NURSE PRACTITIONER

## 2023-03-10 RX ORDER — METHYLPREDNISOLONE ACETATE 80 MG/ML
80 INJECTION, SUSPENSION INTRA-ARTICULAR; INTRALESIONAL; INTRAMUSCULAR; SOFT TISSUE
Status: COMPLETED | OUTPATIENT
Start: 2023-03-10 | End: 2023-03-10

## 2023-03-10 RX ORDER — LIDOCAINE HYDROCHLORIDE 10 MG/ML
2 INJECTION, SOLUTION EPIDURAL; INFILTRATION; INTRACAUDAL; PERINEURAL
Status: COMPLETED | OUTPATIENT
Start: 2023-03-10 | End: 2023-03-10

## 2023-03-10 RX ADMIN — LIDOCAINE HYDROCHLORIDE 2 ML: 10 INJECTION, SOLUTION EPIDURAL; INFILTRATION; INTRACAUDAL; PERINEURAL at 15:20

## 2023-03-10 RX ADMIN — METHYLPREDNISOLONE ACETATE 80 MG: 80 INJECTION, SUSPENSION INTRA-ARTICULAR; INTRALESIONAL; INTRAMUSCULAR; SOFT TISSUE at 15:20

## 2023-03-10 NOTE — PROGRESS NOTES
3/10/2023    Aamir White is here today for worsening knee pain. Pt has undergone injection of the knee in the past with good resolution of symptoms. Pt is requesting a repeat injection.     KNEE Injection Procedure Note:    Large Joint Arthrocentesis: R knee  Date/Time: 3/10/2023 3:20 PM  Consent given by: patient  Site marked: site marked  Timeout: Immediately prior to procedure a time out was called to verify the correct patient, procedure, equipment, support staff and site/side marked as required   Supporting Documentation  Indications: pain   Procedure Details  Location: knee - R knee  Preparation: Patient was prepped and draped in the usual sterile fashion  Needle gauge: 21.  Approach: anterolateral  Medications administered: 80 mg methylPREDNISolone acetate 80 MG/ML; 2 mL lidocaine PF 1% 1 %  Patient tolerance: patient tolerated the procedure well with no immediate complications    Large Joint Arthrocentesis: L knee  Date/Time: 3/10/2023 3:20 PM  Consent given by: patient  Site marked: site marked  Timeout: Immediately prior to procedure a time out was called to verify the correct patient, procedure, equipment, support staff and site/side marked as required   Supporting Documentation  Indications: pain   Procedure Details  Location: knee - L knee  Preparation: Patient was prepped and draped in the usual sterile fashion  Needle gauge: 21.  Approach: anterolateral  Medications administered: 80 mg methylPREDNISolone acetate 80 MG/ML; 2 mL lidocaine PF 1% 1 %  Patient tolerance: patient tolerated the procedure well with no immediate complications          At the conclusion of the injection I discussed the importance of continued quad strengthening exercises on a daily basis. I will see the patient back if the symptoms should fail to improve or worsen.    Connie Feng, APRN

## 2023-03-14 ENCOUNTER — TELEPHONE (OUTPATIENT)
Dept: FAMILY MEDICINE CLINIC | Facility: CLINIC | Age: 48
End: 2023-03-14
Payer: COMMERCIAL

## 2023-03-14 NOTE — TELEPHONE ENCOUNTER
Left Vm for patient to call me back and let me know if the pharmacy was able to get her Wegovy through on her insurance,

## 2023-04-24 NOTE — TELEPHONE ENCOUNTER
Rx Refill Note  Requested Prescriptions     Pending Prescriptions Disp Refills   • Wegovy 0.25 MG/0.5ML solution auto-injector [Pharmacy Med Name: WEGOVY 0.25 MG/0.5 ML PEN] 2 mL 0     Sig: INJECT THE CONTENTS OF ONE PEN UNDER THE SKIN INTO THE APPROPRIATE AREA AS DIRECTED ONE TIME PER WEEK      Last office visit with prescribing clinician: 2/1/2023   Last telemedicine visit with prescribing clinician: Visit date not found   Next office visit with prescribing clinician: Visit date not found       {TIP  Please add Last Relevant Lab Date if appropriate: 11/19/21                 Would you like a call back once the refill request has been completed: [] Yes [] No    If the office needs to give you a call back, can they leave a voicemail: [] Yes [] No    Shefali Cuadra MA  04/24/23, 09:35 EDT

## 2023-04-25 RX ORDER — SEMAGLUTIDE 0.5 MG/.5ML
0.5 INJECTION, SOLUTION SUBCUTANEOUS WEEKLY
Qty: 2 ML | Refills: 0 | Status: SHIPPED | OUTPATIENT
Start: 2023-04-25

## 2023-04-25 RX ORDER — SEMAGLUTIDE 0.25 MG/.5ML
INJECTION, SOLUTION SUBCUTANEOUS
Qty: 2 ML | Refills: 0 | OUTPATIENT
Start: 2023-04-25

## 2023-05-19 RX ORDER — SEMAGLUTIDE 0.25 MG/.5ML
INJECTION, SOLUTION SUBCUTANEOUS
Qty: 2 ML | Refills: 0 | OUTPATIENT
Start: 2023-05-19

## 2023-05-19 RX ORDER — SEMAGLUTIDE 1 MG/.5ML
1 INJECTION, SOLUTION SUBCUTANEOUS WEEKLY
Qty: 2 ML | Refills: 0 | Status: SHIPPED | OUTPATIENT
Start: 2023-05-19

## 2023-05-26 ENCOUNTER — OFFICE VISIT (OUTPATIENT)
Dept: FAMILY MEDICINE CLINIC | Facility: CLINIC | Age: 48
End: 2023-05-26

## 2023-05-26 VITALS
BODY MASS INDEX: 33.46 KG/M2 | OXYGEN SATURATION: 99 % | WEIGHT: 220.8 LBS | TEMPERATURE: 97.1 F | HEART RATE: 90 BPM | SYSTOLIC BLOOD PRESSURE: 118 MMHG | DIASTOLIC BLOOD PRESSURE: 68 MMHG | HEIGHT: 68 IN

## 2023-05-26 DIAGNOSIS — U09.9 POST-COVID CHRONIC COUGH: ICD-10-CM

## 2023-05-26 DIAGNOSIS — Z13.220 SCREENING FOR LIPOID DISORDERS: ICD-10-CM

## 2023-05-26 DIAGNOSIS — Z71.3 WEIGHT LOSS COUNSELING, ENCOUNTER FOR: Primary | ICD-10-CM

## 2023-05-26 DIAGNOSIS — R05.3 POST-COVID CHRONIC COUGH: ICD-10-CM

## 2023-05-26 DIAGNOSIS — Z12.11 SCREENING FOR COLON CANCER: ICD-10-CM

## 2023-05-26 DIAGNOSIS — Z13.29 SCREENING FOR THYROID DISORDER: ICD-10-CM

## 2023-05-26 DIAGNOSIS — R73.09 ELEVATED GLUCOSE: ICD-10-CM

## 2023-05-26 RX ORDER — SEMAGLUTIDE 1.7 MG/.75ML
1.7 INJECTION, SOLUTION SUBCUTANEOUS WEEKLY
Qty: 3 ML | Refills: 0 | Status: SHIPPED | OUTPATIENT
Start: 2023-05-26 | End: 2023-06-02 | Stop reason: RX

## 2023-05-26 RX ORDER — SEMAGLUTIDE 1 MG/.5ML
1 INJECTION, SOLUTION SUBCUTANEOUS WEEKLY
Qty: 2 ML | Refills: 0 | Status: SHIPPED | OUTPATIENT
Start: 2023-05-26

## 2023-05-26 RX ORDER — IBUPROFEN 800 MG/1
800 TABLET ORAL EVERY 8 HOURS PRN
Qty: 90 TABLET | Refills: 0 | Status: SHIPPED | OUTPATIENT
Start: 2023-05-26

## 2023-05-26 RX ORDER — SEMAGLUTIDE 1 MG/.5ML
1 INJECTION, SOLUTION SUBCUTANEOUS WEEKLY
Qty: 2 ML | Refills: 0 | Status: SHIPPED | OUTPATIENT
Start: 2023-05-26 | End: 2023-05-26 | Stop reason: SDUPTHER

## 2023-05-27 LAB
ALBUMIN SERPL-MCNC: 3.7 G/DL (ref 3.5–5.2)
ALBUMIN/GLOB SERPL: 1.7 G/DL
ALP SERPL-CCNC: 65 U/L (ref 39–117)
ALT SERPL-CCNC: 12 U/L (ref 1–33)
AST SERPL-CCNC: 14 U/L (ref 1–32)
BASOPHILS # BLD AUTO: 0.04 10*3/MM3 (ref 0–0.2)
BASOPHILS NFR BLD AUTO: 0.9 % (ref 0–1.5)
BILIRUB SERPL-MCNC: 0.3 MG/DL (ref 0–1.2)
BUN SERPL-MCNC: 8 MG/DL (ref 6–20)
BUN/CREAT SERPL: 10.5 (ref 7–25)
CALCIUM SERPL-MCNC: 8.8 MG/DL (ref 8.6–10.5)
CHLORIDE SERPL-SCNC: 103 MMOL/L (ref 98–107)
CHOLEST SERPL-MCNC: 143 MG/DL (ref 0–200)
CO2 SERPL-SCNC: 26.5 MMOL/L (ref 22–29)
CREAT SERPL-MCNC: 0.76 MG/DL (ref 0.57–1)
EGFRCR SERPLBLD CKD-EPI 2021: 98 ML/MIN/1.73
EOSINOPHIL # BLD AUTO: 0.35 10*3/MM3 (ref 0–0.4)
EOSINOPHIL NFR BLD AUTO: 7.5 % (ref 0.3–6.2)
ERYTHROCYTE [DISTWIDTH] IN BLOOD BY AUTOMATED COUNT: 13.1 % (ref 12.3–15.4)
GLOBULIN SER CALC-MCNC: 2.2 GM/DL
GLUCOSE SERPL-MCNC: 68 MG/DL (ref 65–99)
HCT VFR BLD AUTO: 32.8 % (ref 34–46.6)
HDLC SERPL-MCNC: 43 MG/DL (ref 40–60)
HGB BLD-MCNC: 10.5 G/DL (ref 12–15.9)
IMM GRANULOCYTES # BLD AUTO: 0 10*3/MM3 (ref 0–0.05)
IMM GRANULOCYTES NFR BLD AUTO: 0 % (ref 0–0.5)
LDLC SERPL CALC-MCNC: 84 MG/DL (ref 0–100)
LDLC/HDLC SERPL: 1.94 {RATIO}
LYMPHOCYTES # BLD AUTO: 1.9 10*3/MM3 (ref 0.7–3.1)
LYMPHOCYTES NFR BLD AUTO: 40.8 % (ref 19.6–45.3)
MCH RBC QN AUTO: 27.7 PG (ref 26.6–33)
MCHC RBC AUTO-ENTMCNC: 32 G/DL (ref 31.5–35.7)
MCV RBC AUTO: 86.5 FL (ref 79–97)
MONOCYTES # BLD AUTO: 0.58 10*3/MM3 (ref 0.1–0.9)
MONOCYTES NFR BLD AUTO: 12.4 % (ref 5–12)
NEUTROPHILS # BLD AUTO: 1.79 10*3/MM3 (ref 1.7–7)
NEUTROPHILS NFR BLD AUTO: 38.4 % (ref 42.7–76)
NRBC BLD AUTO-RTO: 0.2 /100 WBC (ref 0–0.2)
PLATELET # BLD AUTO: 293 10*3/MM3 (ref 140–450)
POTASSIUM SERPL-SCNC: 4 MMOL/L (ref 3.5–5.2)
PROT SERPL-MCNC: 5.9 G/DL (ref 6–8.5)
RBC # BLD AUTO: 3.79 10*6/MM3 (ref 3.77–5.28)
SODIUM SERPL-SCNC: 137 MMOL/L (ref 136–145)
TRIGL SERPL-MCNC: 83 MG/DL (ref 0–150)
TSH SERPL DL<=0.005 MIU/L-ACNC: 0.92 UIU/ML (ref 0.27–4.2)
VLDLC SERPL CALC-MCNC: 16 MG/DL (ref 5–40)
WBC # BLD AUTO: 4.66 10*3/MM3 (ref 3.4–10.8)

## 2023-05-30 NOTE — PROGRESS NOTES
"Chief Complaint  Establish Care    Subjective        Aamir White presents to [facility]  History of Present Illness  Aamir is transitioning care today. She is off boarding with Dr. Hernandez. She is currently taking Wegovy for weight loss and tolerating this well. Her Current BMI is 34.    She does report uterine fibroids with heavy cycles, she is going through pads. She was offered hormonal treatment, however she is not interested in taking hormones at this time. She is considering a second opinion.       Objective   Vital Signs:  /68 (BP Location: Right arm, Patient Position: Sitting, Cuff Size: Adult)   Pulse 90   Temp 97.1 °F (36.2 °C) (Temporal)   Ht 171.5 cm (67.52\")   Wt 100 kg (220 lb 12.8 oz)   SpO2 99%   BMI 34.05 kg/m²   Estimated body mass index is 34.05 kg/m² as calculated from the following:    Height as of this encounter: 171.5 cm (67.52\").    Weight as of this encounter: 100 kg (220 lb 12.8 oz).             Physical Exam  Vitals reviewed.   Constitutional:       General: She is not in acute distress.     Appearance: She is well-developed. She is not diaphoretic.   HENT:      Head: Normocephalic and atraumatic.      Right Ear: Tympanic membrane, ear canal and external ear normal.      Left Ear: Tympanic membrane, ear canal and external ear normal.      Nose: Nose normal.      Mouth/Throat:      Pharynx: Uvula midline. No oropharyngeal exudate.   Cardiovascular:      Rate and Rhythm: Normal rate and regular rhythm.      Heart sounds: Normal heart sounds. No murmur heard.    No friction rub. No gallop.   Pulmonary:      Effort: Pulmonary effort is normal. No respiratory distress.      Breath sounds: Normal breath sounds. No wheezing or rales.   Abdominal:      General: Bowel sounds are normal. There is no distension.      Palpations: Abdomen is soft.      Tenderness: There is no abdominal tenderness.   Musculoskeletal:      Cervical back: Neck supple. "   Skin:     General: Skin is warm and dry.   Neurological:      Mental Status: She is alert and oriented to person, place, and time.        Result Review :                   Assessment and Plan   Diagnoses and all orders for this visit:    1. Weight loss counseling, encounter for (Primary)    2. Screening for colon cancer    3. Elevated glucose  -     CBC & Differential  -     Comprehensive Metabolic Panel    4. Screening for lipoid disorders  -     Lipid Panel With LDL / HDL Ratio    5. Screening for thyroid disorder  -     TSH Rfx On Abnormal To Free T4    Other orders  -     Discontinue: Semaglutide-Weight Management (Wegovy) 1 MG/0.5ML solution auto-injector; Inject 0.5 mL under the skin into the appropriate area as directed 1 (One) Time Per Week.  Dispense: 2 mL; Refill: 0  -     Semaglutide-Weight Management (Wegovy) 1.7 MG/0.75ML solution auto-injector; Inject 0.75 mL under the skin into the appropriate area as directed 1 (One) Time Per Week.  Dispense: 3 mL; Refill: 0  -     ibuprofen (ADVIL,MOTRIN) 800 MG tablet; Take 1 tablet by mouth Every 8 (Eight) Hours As Needed for Moderate Pain.  Dispense: 90 tablet; Refill: 0         wegovy taper to continue  BMI is 34 today  Routine labs  Uterine fibroids, recommend follow up with GYN to discuss alternative treatments to hormones to treat heavy menstrual cycles, she agrees with recommendation.       Follow Up   No follow-ups on file.  Patient was given instructions and counseling regarding her condition or for health maintenance advice. Please see specific information pulled into the AVS if appropriate.

## 2023-06-02 RX ORDER — PEN NEEDLE, DIABETIC 30 GX3/16"
1 NEEDLE, DISPOSABLE MISCELLANEOUS DAILY
Qty: 100 EACH | Refills: 0 | Status: SHIPPED | OUTPATIENT
Start: 2023-06-02

## 2023-06-02 RX ORDER — SEMAGLUTIDE 1 MG/.5ML
1 INJECTION, SOLUTION SUBCUTANEOUS WEEKLY
Qty: 2 ML | Refills: 0 | Status: SHIPPED | OUTPATIENT
Start: 2023-06-02

## 2023-06-16 ENCOUNTER — CLINICAL SUPPORT (OUTPATIENT)
Dept: ORTHOPEDIC SURGERY | Facility: CLINIC | Age: 48
End: 2023-06-16
Payer: COMMERCIAL

## 2023-06-16 VITALS — HEIGHT: 68 IN | TEMPERATURE: 97.6 F | BODY MASS INDEX: 33.19 KG/M2 | WEIGHT: 219 LBS

## 2023-06-16 DIAGNOSIS — M17.0 PRIMARY OSTEOARTHRITIS OF BOTH KNEES: Primary | ICD-10-CM

## 2023-06-16 RX ORDER — METHYLPREDNISOLONE ACETATE 80 MG/ML
INJECTION, SUSPENSION INTRA-ARTICULAR; INTRALESIONAL; INTRAMUSCULAR; SOFT TISSUE
Status: COMPLETED | OUTPATIENT
Start: 2023-06-16 | End: 2023-06-16

## 2023-06-16 RX ORDER — LIDOCAINE HYDROCHLORIDE 10 MG/ML
INJECTION, SOLUTION EPIDURAL; INFILTRATION; INTRACAUDAL; PERINEURAL
Status: COMPLETED | OUTPATIENT
Start: 2023-06-16 | End: 2023-06-16

## 2023-06-16 RX ADMIN — LIDOCAINE HYDROCHLORIDE: 10 INJECTION, SOLUTION EPIDURAL; INFILTRATION; INTRACAUDAL; PERINEURAL at 14:40

## 2023-06-16 RX ADMIN — METHYLPREDNISOLONE ACETATE: 80 INJECTION, SUSPENSION INTRA-ARTICULAR; INTRALESIONAL; INTRAMUSCULAR; SOFT TISSUE at 14:40

## 2023-06-16 NOTE — PROGRESS NOTES
6/16/2023    Aamir White is here today for worsening knee pain. Pt has undergone injection of the knee in the past with good resolution of symptoms. Pt is requesting a repeat injection.     KNEE Injection Procedure Note:    Large Joint Arthrocentesis: R knee  Date/Time: 6/16/2023 2:40 PM  Consent given by: patient  Site marked: site marked  Timeout: Immediately prior to procedure a time out was called to verify the correct patient, procedure, equipment, support staff and site/side marked as required   Supporting Documentation  Indications: pain   Procedure Details  Location: knee - R knee  Preparation: Patient was prepped and draped in the usual sterile fashion  Needle gauge: 21.  Approach: anterolateral  Medications administered: methylPREDNISolone acetate 80 MG/ML; lidocaine PF 1% 1 %  Patient tolerance: patient tolerated the procedure well with no immediate complications    Large Joint Arthrocentesis: L knee  Date/Time: 6/16/2023 2:40 PM  Consent given by: patient  Site marked: site marked  Timeout: Immediately prior to procedure a time out was called to verify the correct patient, procedure, equipment, support staff and site/side marked as required   Supporting Documentation  Indications: pain   Procedure Details  Location: knee - L knee  Preparation: Patient was prepped and draped in the usual sterile fashion  Needle gauge: 21.  Approach: anterolateral  Medications administered: methylPREDNISolone acetate 80 MG/ML; lidocaine PF 1% 1 %  Patient tolerance: patient tolerated the procedure well with no immediate complications          At the conclusion of the injection I discussed the importance of continued quad strengthening exercises on a daily basis. I will see the patient back if the symptoms should fail to improve or worsen.    Connie Feng, APRN

## 2023-07-28 ENCOUNTER — OFFICE VISIT (OUTPATIENT)
Dept: FAMILY MEDICINE CLINIC | Facility: CLINIC | Age: 48
End: 2023-07-28
Payer: COMMERCIAL

## 2023-07-28 VITALS
WEIGHT: 214 LBS | HEIGHT: 68 IN | BODY MASS INDEX: 32.43 KG/M2 | HEART RATE: 71 BPM | SYSTOLIC BLOOD PRESSURE: 122 MMHG | OXYGEN SATURATION: 98 % | DIASTOLIC BLOOD PRESSURE: 78 MMHG

## 2023-07-28 DIAGNOSIS — Z71.3 WEIGHT LOSS COUNSELING, ENCOUNTER FOR: Primary | ICD-10-CM

## 2023-07-28 PROCEDURE — 99213 OFFICE O/P EST LOW 20 MIN: CPT | Performed by: NURSE PRACTITIONER

## 2023-07-28 RX ORDER — LIRAGLUTIDE 6 MG/ML
INJECTION, SOLUTION SUBCUTANEOUS
COMMUNITY
Start: 2023-07-27 | End: 2023-07-28

## 2023-07-28 RX ORDER — SEMAGLUTIDE 1.7 MG/.75ML
1.7 INJECTION, SOLUTION SUBCUTANEOUS WEEKLY
Qty: 3 ML | Refills: 0 | Status: SHIPPED | OUTPATIENT
Start: 2023-07-28

## 2023-09-28 NOTE — TELEPHONE ENCOUNTER
Okay to work patient in on my schedule either beginning or end of the day   Plan: Patient to start taking antihistamines for itching of the scalp. Will see her back in 4-6 weeks for f/u Initiate Treatment: fexofenadine 180 mg tablet QAM\\nXyzal 5 mg tablet QHS Detail Level: Zone Initiate Treatment: betamethasone dipropionate 0.05 % lotion BID x 14 days, then QHS

## 2023-10-12 RX ORDER — SEMAGLUTIDE 2.4 MG/.75ML
2.4 INJECTION, SOLUTION SUBCUTANEOUS WEEKLY
Qty: 9 ML | Refills: 1 | Status: SHIPPED | OUTPATIENT
Start: 2023-10-12

## 2023-10-18 ENCOUNTER — OFFICE VISIT (OUTPATIENT)
Dept: FAMILY MEDICINE CLINIC | Facility: CLINIC | Age: 48
End: 2023-10-18
Payer: COMMERCIAL

## 2023-10-18 VITALS
DIASTOLIC BLOOD PRESSURE: 70 MMHG | BODY MASS INDEX: 33.07 KG/M2 | WEIGHT: 218.2 LBS | SYSTOLIC BLOOD PRESSURE: 114 MMHG | HEART RATE: 56 BPM | TEMPERATURE: 97.1 F | OXYGEN SATURATION: 99 % | HEIGHT: 68 IN | RESPIRATION RATE: 18 BRPM

## 2023-10-18 DIAGNOSIS — N92.0 MENORRHAGIA WITH REGULAR CYCLE: ICD-10-CM

## 2023-10-18 DIAGNOSIS — Z71.3 ENCOUNTER FOR WEIGHT LOSS COUNSELING: Primary | ICD-10-CM

## 2023-10-18 NOTE — PROGRESS NOTES
"Chief Complaint  Weight Check    Subjective        Aamir White presents to [facility]  History of Present Illness    Aamir White is a 48-year-old female who presents today for weight-loss follow-up.    The patient reports she is doing well. She is scheduled for a hysterectomy on 11/14/2023 with the same gynecologist. She states she has had two menstrual cycles last month, 09/2023, and she feels like she will have another one. She notes her menstrual cycle is heavy, and it takes her out for 2 days. She reports she is not sleeping at all due to the pain. She states ibuprofen 800 mg is not helping. She notes she has taken Anaprox in the past, and it takes the edge off; however, she notes it does not help much.     The patient reports the Saxenda did not work, and she was experiencing sugar cravings. She adds she knew she was gaining weight. She notes Sunday, 10/15/2023 is her first time taking Wegovy 1.7 mg, and she can tell the difference. She adds she has not taken it for 2 weeks since she was unable to get it. She states she has signed up for [facility], and they had the medication on hold. She notes she has had to make herself eat.  She notes she takes the Wegovy at night.      Objective   Vital Signs:  /70 (BP Location: Right arm, Patient Position: Sitting, Cuff Size: Adult)   Pulse 56   Temp 97.1 °F (36.2 °C) (Temporal)   Resp 18   Ht 171.5 cm (67.52\")   Wt 99 kg (218 lb 3.2 oz)   SpO2 99%   BMI 33.65 kg/m²   Estimated body mass index is 33.65 kg/m² as calculated from the following:    Height as of this encounter: 171.5 cm (67.52\").    Weight as of this encounter: 99 kg (218 lb 3.2 oz).         A review of systems was performed, and the pertinent positives are noted in the HPI.        Physical Exam  Vitals reviewed.   Constitutional:       General: She is not in acute distress.     Appearance: She is well-developed. She is not diaphoretic.   Cardiovascular:      " Rate and Rhythm: Normal rate and regular rhythm.      Heart sounds: Normal heart sounds. No murmur heard.     No friction rub. No gallop.   Pulmonary:      Effort: Pulmonary effort is normal. No respiratory distress.      Breath sounds: Normal breath sounds. No wheezing or rales.   Abdominal:      General: Bowel sounds are normal. There is no distension.      Palpations: Abdomen is soft.      Tenderness: There is no abdominal tenderness.   Musculoskeletal:      Cervical back: Neck supple.   Skin:     General: Skin is warm and dry.   Neurological:      Mental Status: She is alert and oriented to person, place, and time.        Result Review :                   Assessment and Plan   Diagnoses and all orders for this visit:    1. Encounter for weight loss counseling (Primary)    2. Menorrhagia with regular cycle        1. Menorrhagia with regular cycle  - She is scheduled for a hysterectomy in near future    2. Encounter for weight loss, patient struggled on saxenda and then unable to find her prescription. Will continue wegovy, she does notice improvement since starting medication. Weight is up four lbs since last visit. She reports she has lost since starting wegovy., gradual titration to 2.4 mg and follow up every 3 months    Follow-up in 3 months.       Follow Up   No follow-ups on file.  Patient was given instructions and counseling regarding her condition or for health maintenance advice. Please see specific information pulled into the AVS if appropriate.         Transcribed from ambient dictation for LUISA Ruiz by Lucian Hess.  10/18/23   09:45 EDT    Patient or patient representative verbalized consent to the visit recording.  I have personally performed the services described in this document as transcribed by the above individual, and it is both accurate and complete.

## 2023-11-08 NOTE — PROGRESS NOTES
5/12/2022    Aamir White is here today for worsening knee pain. Pt has undergone injection of the knee in the past with good resolution of symptoms. Pt is requesting a repeat injection.     KNEE Injection Procedure Note:    Large Joint Arthrocentesis: R knee  Date/Time: 5/12/2022 4:55 PM  Consent given by: patient  Site marked: site marked  Timeout: Immediately prior to procedure a time out was called to verify the correct patient, procedure, equipment, support staff and site/side marked as required   Supporting Documentation  Indications: pain and joint swelling   Procedure Details  Location: knee - R knee  Preparation: Patient was prepped and draped in the usual sterile fashion  Needle size: 22 G  Approach: anterolateral  Medications administered: 80 mg methylPREDNISolone acetate 80 MG/ML; 2 mL lidocaine PF 2% 2 %  Patient tolerance: patient tolerated the procedure well with no immediate complications    Large Joint Arthrocentesis: L knee  Date/Time: 5/12/2022 4:55 PM  Consent given by: patient  Site marked: site marked  Timeout: Immediately prior to procedure a time out was called to verify the correct patient, procedure, equipment, support staff and site/side marked as required   Supporting Documentation  Indications: pain and joint swelling   Procedure Details  Location: knee - L knee  Preparation: Patient was prepped and draped in the usual sterile fashion  Needle size: 22 G  Approach: anterolateral  Medications administered: 80 mg methylPREDNISolone acetate 80 MG/ML; 2 mL lidocaine PF 2% 2 %  Patient tolerance: patient tolerated the procedure well with no immediate complications          At the conclusion of the injection I discussed the importance of continued quad strengthening exercises on a daily basis. I will see the patient back if the symptoms should fail to improve or worsen.    Connie Feng, APRN  5/12/2022       VISIT NOTE       Chief Complaint   Patient presents with   â¢ Office Visit   â¢ Diabetes Mellitus     Pt has noticved since stopping metformin, her blood sugars have been elevated. Alvin [place]   [id number]  History Of Present Illness  Helio Cruz is a 63year old female presenting with type 2 diabetes. Has had diabetes for 10 to 15 years currently takes metformin, Soliqua 26 units. Checks her blood sugars regularly noted to have blood sugars ranging [id number]  Follows diabetic diet prefers to be referred to nutrition services to help with weight loss  Not very physically active has a treadmill at home. No history of coronary artery disease or stroke in the past  Hypertension on losartan 25 mg daily blood pressure was elevated in office today  Hyperlipidemia on simvastatin  Noted to have nephropathy. On losartan  No neuropathy monofilament was bilaterally intact in office today  No history of retinopathy follows up with ophthalmology regularly  No history of pancreatitis or thyroid issues. History of yeast infections when she was on Jardiance. Blood work from January 2023  Noted to have subclinical hyperthyroidism. Also noted to have multiple thyroid nodules one of the nodules in isthmus 2.9  has been biopsied in the past and confirmed to be benign. Most recent ultrasound in October 2022 January 2023  A1c 7.2  Vit D 39  TSH 0.338. free T4 1.16  Free T3 3.2  , LDL 82, , HDL 44  Urine 14  Cr 1.12  gfr 57    . Left isthmic nodule measures minimally larger. Correlate with prior  biopsy results. 2.    Left lower thyroid nodule measures minimally larger. It does not  meet imaging criteria for tissue sampling.   Additional subcentimeter right  and left thyroid nodules do not meet imaging criteria for tissue sampling  Â   Â   ACR TI-RADS guidelines:  TR 1 (0 points) and TR 2 (2 points) = no FNA or follow-up  TR 3 (3 points) = FNA if greater than or equal to 2.5 cm, follow-up  ultrasound if 1.5-2.4 cm in 1, 3 and 5 years  TR 4 (4-6 points) = FNA of greater than or equal to 1.5 cm, follow-up  ultrasound if 1.0-1.4 cm in 1, 2, 3 and 5 years  TR 5 (7+ points) = FNA if greater than or equal to 1 cm, follow-up  ultrasound if 0.5-0.9 cm every 1 year for 5 years. Â   Electronically Signed by: Mildred Elizabeth M.D. Signed on: 10/1/2022 11:44 AM     6/29/23  The patient reports experiencing difficulty tolerating Ozempic, describing the 1 milligram shots as very rough. They acknowledge that their body may still be adjusting to the medication and are willing to continue using it. The patient has lost 10 pounds since their last visit in March and has committed to incorporating more exercise into their daily routine, aiming for 15 minutes in the morning, at lunch, and in the evening. They are also working on improving their diet, hydration, and stress management. The patient is due for blood work and mentions that their last blood work was in January, with an A1c of 7.2, a GFR of 57, and slightly off thyroid levels. They have an upcoming thyroid ultrasound scheduled for September. The patient is currently taking 500 mg of Metformin in the morning and at night, as well as 22 units of insulin. They report not taking their medication recently due to feeling sick. The patient has a scheduled appointment for a dilated eye exam for diabetes screening. The patient was unable to keep a previous appointment with nutrition services. They report having a prescription for fluconazole from their gynecologist, Dr. Kelby Rubi, for a breakout related to Lichen Planus. The patient has a history of subclinical hyperthyroidism and benign thyroid nodules, with a follow-up ultrasound scheduled for the end of the year. 11/8/23  The patient reports noticing an increase in blood sugar levels after stopping metformin for a couple of weeks and is considering resuming the evening dose.  The patient has a positive experience with metformin and has not experienced any side effects. The patient's last A1c was checked four months ago, and they are due for another test today. The patient has a history of chronic kidney disease (CKD) with a recent GFR of 51 and creatinine of 1.22. The patient is currently managing their diabetes with Tresiba at 15 units and Ozempic at 1 milligram weekly. They have lost 22 pounds and are aiming to lose 10 to 15 pounds every three months. The patient is considering increasing the Ozempic dose to 2 milligrams but is unsure of the potential side effects. The patient is also taking medication for blood pressure and has been advised to take it in the morning and evening. They are currently on simvastatin and have an upcoming eye appointment. The patient is interested in a referral to a nephrologist for further evaluation of their kidney function. Diagnostic test results:  - A1c: Last checked four months ago, due for recheck today. - Cholesterol levels: Good, LDL was 79 four months ago. - Kidney function tests: GFR 51, creatinine 1.22 (recent); GFR 57, creatinine 1.12 (January). - Urine protein: Normal.  - Weight: Lost 22 pounds, current weight not mentioned; weight in March was 265 pounds.   Past Medical History  Past Medical History:   Diagnosis Date   â¢ Diabetes mellitus (CMD) 08/16/2019        Surgical History  Past Surgical History:   Procedure Laterality Date   â¢ Laparoscopic hysterectomy  2008    with BSO   â¢ Us vasc guided endovenous radiofrequency ablation Right 05/10/2019    RT GSV RFA   â¢ Us vasc guided endovenous radiofrequency ablation Right 07/16/2021    RIGHT LESSER SAPHENOUS VEIN        Social History  Social History     Tobacco Use   â¢ Smoking status: Every Day     Current packs/day: 1.00     Average packs/day: 1 pack/day for 30.0 years (30.0 ttl pk-yrs)     Types: Cigarettes   â¢ Smokeless tobacco: Never   â¢ Tobacco comments:     Now smokes 0.5 packs a day   Substance Use Topics   â¢ Alcohol use: Not Currently   â¢ Drug use: Not Currently     Types: Marijuana       Family History    Family History   Problem Relation Age of Onset   â¢ Cancer Mother    â¢ Diabetes Mother    â¢ Heart Father        Allergies  ALLERGIES:  Penicillins    Medications  Current Outpatient Medications   Medication Sig   â¢ Cyanocobalamin (VITAMIN B 12 PO)    â¢ Multiple Vitamins-Minerals (Centrum Silver 50+Women) Tab    â¢ insulin degludec Marshia Reagin FlexTouch) 200 UNIT/ML pen-injector Inject 18 Units into the skin daily. Prime 2 units before each dose. (Patient taking differently: Inject 15 Units into the skin daily. Prime 2 units before each dose.)   â¢ Accu-Chek Brenda Plus test strip Test blood sugar 3 times daily. Diagnosis: DM. Meter: Accu chek brenda   â¢ BD Pen Needle Abi 2nd Gen 32G X 4 MM Misc Use to inject insulin 1 times daily. Remove needle cover(s) to expose needle before injecting. â¢ fluconazole (DIFLUCAN) 150 MG tablet TAKE 1 TABLET BY MOUTH 1 TIME FOR 1 DOSE. â¢ metFORMIN (GLUCOPHAGE-XR) 500 MG 24 hr tablet TAKE 1 TABLET BY MOUTH TWICE A DAY WITH EVENING MEAL   â¢ Probiotic Product (PROBIOTIC PO) One tablet daily   â¢ losartan (COZAAR) 25 MG tablet Take 1 tablet by mouth in the morning and 1 tablet in the evening. (Patient taking differently: Take 25 mg by mouth daily.)   â¢ Semaglutide, 1 MG/DOSE, (Ozempic, 1 MG/DOSE,) 4 MG/3ML Solution Pen-injector Inject 1 mg into the skin 1 day a week. Do not start before May 3, 2023. â¢ imiquimod (Aldara) 5 % cream Apply topically nightly. Apply small amount to affected area only every night until cleared. Use only for maximum of 8 weeks. â¢ Moringa 500 MG Cap 2 tablets a day   â¢ metformin (GLUCOPHAGE) 1000 MG tablet Pt reported 500 mg  Twice daily   â¢ simvastatin (ZOCOR) 10 MG tablet TAKE 1 TABLET BEDTIME   â¢ B Complex-C (SUPER B COMPLEX PO)    â¢ Cholecalciferol (VITAMIN D3) 1000 units capsule daily     No current facility-administered medications for this visit.         Review of Systems "  All other systems reviewed and are negative. Physical Exam  Vitals and nursing note reviewed. Constitutional:       Appearance: Normal appearance. HENT:      Head: Normocephalic. Nose: Nose normal.   Eyes:      Conjunctiva/sclera: Conjunctivae normal.      Pupils: Pupils are equal, round, and reactive to light. Pulmonary:      Effort: Pulmonary effort is normal.   Abdominal:      General: Abdomen is flat. Musculoskeletal:         General: Normal range of motion. Neurological:      Mental Status: She is alert and oriented to person, place, and time. Mental status is at baseline. Psychiatric:         Mood and Affect: Mood normal.         Behavior: Behavior normal.         Judgment: Judgment normal.      Vital Signs: Blood pressure 146 (high)  Physical Examination Findings: BMI 40, weight loss of 10 pounds since last visit  Diagnostic Test Results: A1c 7.2, GFR 57, cholesterol levels within normal range, TSH 0.3, pending thyroid labs and ultrasound in September, pending dilated eye exam for diabetes screening     Last Recorded Vitals  Vitals:    11/08/23 0841   BP: (!) 146/78   Pulse: 83   Temp: 96.8 Â°F (36 Â°C)   TempSrc: Temporal   Weight: 110.2 kg (243 lb)   Height: 5' 4"" (1.626 m)          Imaging:    US THYROID  Narrative: EXAM: US THYROID. INDICATION: 63years old Female with thyroid nodules    COMPARISON: Thyroid ultrasound 10/1/2022    TECHNIQUE: Real-time grayscale and color flow imaging of the thyroid was  performed. FINDINGS:    Right lobe: The right thyroid lobe measures 5.8 x 2.5 x 1.8 cm. The right thyroid lobe  demonstrates normal echotexture and vascularity. Located at the midpole is  a 0.6 x 0.6 x 0.5 similar spongiform isoechoic nodule (TR 2), unchanged. Left lobe: The left thyroid lobe measures 6.3 x 2.7 x 1.7 cm. The left thyroid lobe  demonstrates normal echotexture and vascularity.  Located at the lower pole  is a 1.2 x 1.2 x 0.7 cm solid appearing isoechoic " nodule (TR 3), unchanged. Isthmus: The isthmus measures 0.9 cm in thickness. The thyroid isthmus demonstrates  normal echotexture and vascularity. Located within the isthmus there is a  2.9 x 2.0 x 1.1 cm isoechoic solid appearing nodule (TR 3), unchanged. Impression: 1. Thyroid nodules described above appear unchanged compared to the  previous ultrasound. The isthmus nodule measuring up to 2.9 cm was  previously biopsied. Additional follow-up can be obtained as clinically  warranted.    ___________________  ACR TI-RADS recommendations:  TR5 (>= 7 points) - FNA if >= 1 cm, follow-up if 0.5 - 0.9 cm annually for  5 years  TR4 (4-6 points) - FNA if >=1.5 cm, follow-up if 1-1.4 cm in 1, 2, 3, and 5  years  TR3 (3 points) - FNA if >= 2.5 cm, follow-up if 1.5-2.4 cm in 1, 3, and 5  years  TR2 (2 points) and TR1 (0 points) - No FNA or follow-up. Electronically Signed by: Anil Bentley DO   Signed on: 10/21/2023 5:05 PM   Workstation ID: [email or web address]        Labs  No results found for this visit on 11/08/23. Saw Munoz was seen today for office visit and diabetes mellitus. Diagnoses and all orders for this visit:    Type 2 diabetes mellitus with stage 1 chronic kidney disease, with long-term current use of insulin (CMD)  -     Comprehensive Metabolic Panel; Future  -     Glycohemoglobin; Future  -     SERVICE TO NEPHROLOGY    Primary hypertension    Hypercholesterolemia         There are no Patient Instructions on file for this visit. Primary Care Physician  Annabel Munoz MD    Assessment and plan  1. 1. Type 2 Diabetes Mellitus:  - Continue Tresiba 15 units daily  - Reintroduce Metformin 500 mg at bedtime  - Continue Ozempic 1 mg weekly  - Monitor blood glucose levels daily with finger prick  - A1c to be checked today    2.  Chronic Kidney Disease (CKD):  - Maintain good glycemic and blood pressure control to prevent worsening of CKD  - Monitor GFR and creatinine levels  - Referral to nephrologist for evaluation    3. Dyslipidemia:  - Continue Simvastatin as prescribed  - Monitor cholesterol levels    4. Hypertension:  - Take blood pressure medication as prescribed, one in the morning and one in the evening  - Monitor blood pressure regularly    5. Weight management:  - Encourage weight loss of 10-15 pounds every three months (1-2 pounds per week)  - Consider increasing Ozempic to 2 mg if weight loss plateaus    6. Diabetic eye exam:  - Schedule an appointment with an ophthalmologist for a diabetic eye exam    7.  Follow-up:  - Schedule a follow-up appointment in three months  - Review blood test results from today's visit    Aly Wayne MD

## 2023-12-15 ENCOUNTER — TELEPHONE (OUTPATIENT)
Dept: FAMILY MEDICINE CLINIC | Facility: CLINIC | Age: 48
End: 2023-12-15

## 2023-12-15 NOTE — TELEPHONE ENCOUNTER
Caller: [facility] - [facility]    Best call back number: [phone]    Who are you requesting to speak with (clinical staff, provider,  specific staff member): CLINICAL     What was the call regarding: [facility] WITH [facility] STATES SHE WANTED TO LET THE OFFICE KNOW THAT THE PRIOR AUTHORIZATION FOR THE WEGOVY WAS APPROVED AND THEY WILL BE FAXING IT OVER TO THE OFFICE.    [facility] STATES SHE IS REQUESTING BRISEYDA DE LA O'S OFFICE TO CONTACT THE PATIENT TO LET HER KNOW.     CASE NUMBER: [id number]

## 2024-02-16 ENCOUNTER — OFFICE VISIT (OUTPATIENT)
Dept: FAMILY MEDICINE CLINIC | Facility: CLINIC | Age: 49
End: 2024-02-16
Payer: COMMERCIAL

## 2024-02-16 VITALS
HEART RATE: 83 BPM | RESPIRATION RATE: 18 BRPM | BODY MASS INDEX: 30.8 KG/M2 | OXYGEN SATURATION: 99 % | TEMPERATURE: 97.5 F | WEIGHT: 203.2 LBS | HEIGHT: 68 IN | DIASTOLIC BLOOD PRESSURE: 68 MMHG | SYSTOLIC BLOOD PRESSURE: 120 MMHG

## 2024-02-16 DIAGNOSIS — Z13.29 SCREENING FOR THYROID DISORDER: ICD-10-CM

## 2024-02-16 DIAGNOSIS — Z71.3 ENCOUNTER FOR WEIGHT LOSS COUNSELING: Primary | ICD-10-CM

## 2024-02-16 DIAGNOSIS — Z13.220 SCREENING FOR LIPOID DISORDERS: ICD-10-CM

## 2024-02-16 DIAGNOSIS — R73.09 ELEVATED GLUCOSE: ICD-10-CM

## 2024-02-16 RX ORDER — SEMAGLUTIDE 2.4 MG/.75ML
2.4 INJECTION, SOLUTION SUBCUTANEOUS WEEKLY
Qty: 9 ML | Refills: 1 | Status: SHIPPED | OUTPATIENT
Start: 2024-02-16

## 2024-02-17 LAB
ALBUMIN SERPL-MCNC: 3.7 G/DL (ref 3.5–5.2)
ALBUMIN/GLOB SERPL: 1.4 G/DL
ALP SERPL-CCNC: 67 U/L (ref 39–117)
ALT SERPL-CCNC: 8 U/L (ref 1–33)
AST SERPL-CCNC: 13 U/L (ref 1–32)
BASOPHILS # BLD AUTO: 0.05 10*3/MM3 (ref 0–0.2)
BASOPHILS NFR BLD AUTO: 1 % (ref 0–1.5)
BILIRUB SERPL-MCNC: 0.2 MG/DL (ref 0–1.2)
BUN SERPL-MCNC: 10 MG/DL (ref 6–20)
BUN/CREAT SERPL: 16.1 (ref 7–25)
CALCIUM SERPL-MCNC: 8.9 MG/DL (ref 8.6–10.5)
CHLORIDE SERPL-SCNC: 106 MMOL/L (ref 98–107)
CHOLEST SERPL-MCNC: 162 MG/DL (ref 0–200)
CO2 SERPL-SCNC: 22.1 MMOL/L (ref 22–29)
CREAT SERPL-MCNC: 0.62 MG/DL (ref 0.57–1)
EGFRCR SERPLBLD CKD-EPI 2021: 111.4 ML/MIN/1.73
EOSINOPHIL # BLD AUTO: 0.65 10*3/MM3 (ref 0–0.4)
EOSINOPHIL NFR BLD AUTO: 13.1 % (ref 0.3–6.2)
ERYTHROCYTE [DISTWIDTH] IN BLOOD BY AUTOMATED COUNT: 14.5 % (ref 12.3–15.4)
GLOBULIN SER CALC-MCNC: 2.6 GM/DL
GLUCOSE SERPL-MCNC: 66 MG/DL (ref 65–99)
HCT VFR BLD AUTO: 34.9 % (ref 34–46.6)
HDLC SERPL-MCNC: 48 MG/DL (ref 40–60)
HGB BLD-MCNC: 11.2 G/DL (ref 12–15.9)
IMM GRANULOCYTES # BLD AUTO: 0.01 10*3/MM3 (ref 0–0.05)
IMM GRANULOCYTES NFR BLD AUTO: 0.2 % (ref 0–0.5)
LDLC SERPL CALC-MCNC: 100 MG/DL (ref 0–100)
LDLC/HDLC SERPL: 2.08 {RATIO}
LYMPHOCYTES # BLD AUTO: 1.56 10*3/MM3 (ref 0.7–3.1)
LYMPHOCYTES NFR BLD AUTO: 31.4 % (ref 19.6–45.3)
MCH RBC QN AUTO: 27.9 PG (ref 26.6–33)
MCHC RBC AUTO-ENTMCNC: 32.1 G/DL (ref 31.5–35.7)
MCV RBC AUTO: 87 FL (ref 79–97)
MONOCYTES # BLD AUTO: 0.61 10*3/MM3 (ref 0.1–0.9)
MONOCYTES NFR BLD AUTO: 12.3 % (ref 5–12)
NEUTROPHILS # BLD AUTO: 2.09 10*3/MM3 (ref 1.7–7)
NEUTROPHILS NFR BLD AUTO: 42 % (ref 42.7–76)
NRBC BLD AUTO-RTO: 0 /100 WBC (ref 0–0.2)
PLATELET # BLD AUTO: 253 10*3/MM3 (ref 140–450)
POTASSIUM SERPL-SCNC: 4.3 MMOL/L (ref 3.5–5.2)
PROT SERPL-MCNC: 6.3 G/DL (ref 6–8.5)
RBC # BLD AUTO: 4.01 10*6/MM3 (ref 3.77–5.28)
SODIUM SERPL-SCNC: 140 MMOL/L (ref 136–145)
TRIGL SERPL-MCNC: 72 MG/DL (ref 0–150)
TSH SERPL DL<=0.005 MIU/L-ACNC: 0.57 UIU/ML (ref 0.27–4.2)
VLDLC SERPL CALC-MCNC: 14 MG/DL (ref 5–40)
WBC # BLD AUTO: 4.97 10*3/MM3 (ref 3.4–10.8)

## 2024-07-17 ENCOUNTER — OFFICE VISIT (OUTPATIENT)
Dept: FAMILY MEDICINE CLINIC | Facility: CLINIC | Age: 49
End: 2024-07-17
Payer: COMMERCIAL

## 2024-07-17 VITALS
TEMPERATURE: 98 F | DIASTOLIC BLOOD PRESSURE: 80 MMHG | SYSTOLIC BLOOD PRESSURE: 130 MMHG | OXYGEN SATURATION: 99 % | RESPIRATION RATE: 14 BRPM | WEIGHT: 217.2 LBS | HEIGHT: 68 IN | BODY MASS INDEX: 32.92 KG/M2 | HEART RATE: 80 BPM

## 2024-07-17 DIAGNOSIS — R53.83 FATIGUE, UNSPECIFIED TYPE: ICD-10-CM

## 2024-07-17 DIAGNOSIS — E04.1 THYROID NODULE: Primary | ICD-10-CM

## 2024-07-17 PROCEDURE — 99213 OFFICE O/P EST LOW 20 MIN: CPT | Performed by: NURSE PRACTITIONER

## 2024-07-18 LAB
THYROPEROXIDASE AB SERPL-ACNC: <9 IU/ML (ref 0–34)
TSH SERPL DL<=0.005 MIU/L-ACNC: 0.98 UIU/ML (ref 0.45–4.5)

## 2024-07-18 NOTE — PROGRESS NOTES
"Chief Complaint  thyroid concerns    Subjective        Aamir White presents to [facility]  History of Present Illness  History of Present Illness  The patient presents for evaluation of multiple medical concerns.    The patient has experienced a weight gain of 17 pounds, which she attributes to her insurance no longer covering Wegovy. She is currently on a regimen of Wegovy 1 mg, which contains B12. Initially, she was maintaining her weight for a significant period, but recently, she has been gradually increasing the dosage. She expresses a reluctance to continue the medication due to perceived lack of efficacy.    The patient expresses a desire to have her thyroid levels checked. She reports experiencing hoarseness and shortness of breath after singing at [employer] on Sundays. She denies any symptoms of allergies, postnasal drainage, or acid reflux. She also mentions experiencing fatigue, which she attributes to her hysterectomy.    Objective   Vital Signs:  /80   Pulse 80   Temp 98 °F (36.7 °C) (Oral)   Resp 14   Ht 171.5 cm (67.52\")   Wt 98.5 kg (217 lb 3.2 oz)   SpO2 99%   BMI 33.50 kg/m²   Estimated body mass index is 33.5 kg/m² as calculated from the following:    Height as of this encounter: 171.5 cm (67.52\").    Weight as of this encounter: 98.5 kg (217 lb 3.2 oz).               Physical Exam  Constitutional:       General: She is not in acute distress.     Appearance: She is well-developed. She is not diaphoretic.   Cardiovascular:      Rate and Rhythm: Normal rate and regular rhythm.      Heart sounds: Normal heart sounds. No murmur heard.     No friction rub. No gallop.   Pulmonary:      Effort: Pulmonary effort is normal. No respiratory distress.      Breath sounds: Normal breath sounds. No wheezing or rales.   Musculoskeletal:      Cervical back: Neck supple.   Skin:     General: Skin is warm and dry.   Neurological:      Mental Status: She is alert and " oriented to person, place, and time.       Physical Exam      Result Review :          Results      Assessment & Plan  1. Weight gain.  A TSH and TPO test will be conducted.    2. Hoarseness.  An ultrasound of the thyroid and neck will be ordered.           Assessment and Plan     Diagnoses and all orders for this visit:    1. Thyroid nodule (Primary)  -     TSH Rfx On Abnormal To Free T4  -     Thyroid Peroxidase Antibody  -     US Thyroid; Future    2. Fatigue, unspecified type  -     TSH Rfx On Abnormal To Free T4  -     Thyroid Peroxidase Antibody  -     US Thyroid; Future             Follow Up     No follow-ups on file.  Patient was given instructions and counseling regarding her condition or for health maintenance advice. Please see specific information pulled into the AVS if appropriate.       Patient or patient representative verbalized consent for the use of Ambient Listening during the visit with  LUISA Ruiz for chart documentation. 7/18/2024  18:06 EDT

## 2024-07-26 RX ORDER — PANTOPRAZOLE SODIUM 40 MG/1
40 TABLET, DELAYED RELEASE ORAL DAILY
Qty: 30 TABLET | Refills: 1 | Status: SHIPPED | OUTPATIENT
Start: 2024-07-26

## 2024-08-28 ENCOUNTER — OFFICE VISIT (OUTPATIENT)
Dept: FAMILY MEDICINE CLINIC | Facility: CLINIC | Age: 49
End: 2024-08-28
Payer: COMMERCIAL

## 2024-08-28 VITALS
DIASTOLIC BLOOD PRESSURE: 84 MMHG | BODY MASS INDEX: 31.36 KG/M2 | RESPIRATION RATE: 16 BRPM | OXYGEN SATURATION: 99 % | HEART RATE: 71 BPM | HEIGHT: 68 IN | TEMPERATURE: 96.6 F | SYSTOLIC BLOOD PRESSURE: 152 MMHG | WEIGHT: 206.9 LBS

## 2024-08-28 DIAGNOSIS — R10.13 EPIGASTRIC PAIN: Primary | ICD-10-CM

## 2024-08-28 DIAGNOSIS — K21.9 GASTROESOPHAGEAL REFLUX DISEASE, UNSPECIFIED WHETHER ESOPHAGITIS PRESENT: ICD-10-CM

## 2024-08-28 PROCEDURE — 99213 OFFICE O/P EST LOW 20 MIN: CPT | Performed by: NURSE PRACTITIONER

## 2024-08-28 RX ORDER — SUCRALFATE 1 G/1
1 TABLET ORAL 4 TIMES DAILY
Qty: 40 TABLET | Refills: 0 | Status: SHIPPED | OUTPATIENT
Start: 2024-08-28

## 2024-08-28 NOTE — PROGRESS NOTES
"Chief Complaint  GI Problem (Stomach issues no better with taking Pantoprazole, wants to be tested for H Pylori )    Subjective        Aamir White presents to [facility]  GI Problem      History of Present Illness  The patient presents for evaluation of abdominal pain.    She continues to experience discomfort in her abdomen, which has intensified since starting wegovy, which she is no longer taking. The pain is more pronounced on the left side and is accompanied by cramping and episodes of near-vomiting. She describes the discomfort as worsening when her stomach is empty and improving after eating, although she often feels hungry again within an hour of eating. Despite taking Protonix, she has not noticed significant improvement.    A few years ago, a stool test conducted by a holistic provider revealed the presence of a parasite, but no treatment was pursued at that time. Her sister, who had similar symptoms, advised her to seek medical attention. She recalls undergoing a colonoscopy in 2019, during which it was noted that she did not produce sufficient mucus for optimal bowel movements. She was diagnosed with constipation at that time.    She also experiences postnasal drainage and a runny nose, which she attributes to allergies, although she does not take any allergy medication. The scratchiness, hoarseness, and burning sensation in her esophagus have subsided. She occasionally feels constipated and consumes an herbal tea to alleviate this, which she finds beneficial for her bowel movements, mood, and depression. She reports no frequent use of ibuprofen.        Objective   Vital Signs:  /84 (BP Location: Right arm, Patient Position: Sitting, Cuff Size: Adult)   Pulse 71   Temp 96.6 °F (35.9 °C)   Resp 16   Ht 171.5 cm (67.52\")   Wt 93.8 kg (206 lb 14.4 oz)   SpO2 99%   BMI 31.91 kg/m²   Estimated body mass index is 31.91 kg/m² as calculated from the following:    " "Height as of this encounter: 171.5 cm (67.52\").    Weight as of this encounter: 93.8 kg (206 lb 14.4 oz).               Physical Exam  Constitutional:       General: She is not in acute distress.     Appearance: She is well-developed. She is not diaphoretic.   Cardiovascular:      Rate and Rhythm: Normal rate and regular rhythm.      Heart sounds: Normal heart sounds. No murmur heard.     No friction rub. No gallop.   Pulmonary:      Effort: Pulmonary effort is normal. No respiratory distress.      Breath sounds: Normal breath sounds. No wheezing or rales.   Abdominal:      Tenderness: There is abdominal tenderness in the epigastric area and left upper quadrant.   Musculoskeletal:      Cervical back: Neck supple.   Skin:     General: Skin is warm and dry.   Neurological:      Mental Status: She is alert and oriented to person, place, and time.   Psychiatric:         Mood and Affect: Mood normal.       Physical Exam      Result Review :          Results      Assessment & Plan  1. Abdominal pain.  The abdominal discomfort may be due to constipation, reflux, or gastritis. The presence of H. pylori is also considered. An H. pylori breath test will be conducted today. An ova and parasite (O & P) test will be ordered to check for any eggs or worms in the stool. A referral to gastroenterology has been made. She is advised to continue with Protonix. Carafate has been prescribed, to be taken four times daily for 10 days as a preventative measure. An x-ray of the abdomen will be performed to rule out constipation or changes in bowel gas. If the O & P test is positive, appropriate treatment will be initiated. If the H. pylori test is negative, she will proceed with the gastroenterology appointment. If it is positive, treatment will be started and monitored for effectiveness.    2. Postnasal drainage.  The postnasal drainage could be contributing to the hoarseness. She is advised to try an allergy medication consistently for a " week to see if it helps with the symptoms. If there is no improvement, alternative treatments will be considered.               Assessment and Plan     Diagnoses and all orders for this visit:    1. Epigastric pain (Primary)  -     H. Pylori Breath Test - Breath, Lung  -     Ambulatory Referral to Gastroenterology    2. Gastroesophageal reflux disease, unspecified whether esophagitis present  -     H. Pylori Breath Test - Breath, Lung  -     Ambulatory Referral to Gastroenterology    Other orders  -     sucralfate (Carafate) 1 g tablet; Take 1 tablet by mouth 4 (Four) Times a Day.  Dispense: 40 tablet; Refill: 0             Follow Up     No follow-ups on file.  Patient was given instructions and counseling regarding her condition or for health maintenance advice. Please see specific information pulled into the AVS if appropriate.       Patient or patient representative verbalized consent for the use of Ambient Listening during the visit with  LUISA Ruiz for chart documentation. 8/28/2024  08:36 EDT

## 2024-08-29 LAB — UREA BREATH TEST QL: NEGATIVE

## 2024-09-06 ENCOUNTER — OFFICE VISIT (OUTPATIENT)
Dept: FAMILY MEDICINE CLINIC | Facility: CLINIC | Age: 49
End: 2024-09-06
Payer: COMMERCIAL

## 2024-09-06 VITALS
BODY MASS INDEX: 31.07 KG/M2 | WEIGHT: 205 LBS | HEIGHT: 68 IN | DIASTOLIC BLOOD PRESSURE: 92 MMHG | SYSTOLIC BLOOD PRESSURE: 134 MMHG | HEART RATE: 82 BPM | OXYGEN SATURATION: 100 %

## 2024-09-06 DIAGNOSIS — F41.9 ANXIETY: ICD-10-CM

## 2024-09-06 DIAGNOSIS — I10 PRIMARY HYPERTENSION: Primary | ICD-10-CM

## 2024-09-06 PROCEDURE — 99214 OFFICE O/P EST MOD 30 MIN: CPT | Performed by: NURSE PRACTITIONER

## 2024-09-06 RX ORDER — AMLODIPINE BESYLATE 2.5 MG/1
2.5 TABLET ORAL DAILY
Qty: 30 TABLET | Refills: 1 | Status: SHIPPED | OUTPATIENT
Start: 2024-09-06

## 2024-09-06 RX ORDER — FLUOXETINE 10 MG/1
10 CAPSULE ORAL DAILY
Qty: 30 CAPSULE | Refills: 1 | Status: SHIPPED | OUTPATIENT
Start: 2024-09-06

## 2024-09-06 NOTE — PROGRESS NOTES
"Chief Complaint  Hypertension    Subjective        Aamir White presents to [facility]  History of Present Illness  History of Present Illness  The patient presents for evaluation of multiple medical concerns.    She reports experiencing high blood pressure, which was also noted during her recent dental visit. A few days ago, her blood pressure was recorded as 159/98. She describes a sensation of tightness in her arms and attributes her elevated blood pressure to stress. She has not previously had issues with hypertension. She is concerned about the potential need for lifelong medication for her blood pressure.    She has been feeling extremely fatigued and believes her work is the primary source of her stress. She expresses a desire to function without feeling overwhelmed and believes her stress levels are contributing to her high blood pressure. She is unsure if her dizziness is related to her blood pressure or a neurological condition.    She has never used antidepressants before and is worried about the potential impact of Prozac on her personality and its ability to manage her mood fluctuations. She uses essential oils containing ENEDELIA and other ingredients but is uncertain about their effectiveness. She does report increased anxiety and would like to try something to take the edge off.    Objective   Vital Signs:  /92 (BP Location: Left arm, Patient Position: Sitting, Cuff Size: Adult)   Pulse 82   Ht 171.5 cm (67.52\")   Wt 93 kg (205 lb)   SpO2 100%   BMI 31.61 kg/m²   Estimated body mass index is 31.61 kg/m² as calculated from the following:    Height as of this encounter: 171.5 cm (67.52\").    Weight as of this encounter: 93 kg (205 lb).               Physical Exam  Constitutional:       General: She is not in acute distress.     Appearance: She is well-developed. She is not diaphoretic.   Cardiovascular:      Rate and Rhythm: Normal rate and regular rhythm.      " Heart sounds: Normal heart sounds. No murmur heard.     No friction rub. No gallop.   Pulmonary:      Effort: Pulmonary effort is normal. No respiratory distress.      Breath sounds: Normal breath sounds. No wheezing or rales.   Musculoskeletal:      Cervical back: Neck supple.   Skin:     General: Skin is warm and dry.   Neurological:      Mental Status: She is alert and oriented to person, place, and time.       Physical Exam  Vital Signs  Blood pressure reading is 134/92.    Result Review :          Results      Assessment & Plan  1. Hypertension.  Her blood pressure was elevated during the last visit and remains high today at 134/92, although slightly improved. She reports dizziness, which could be a result of her hypertension. Amlodipine 2.5 mg was prescribed to manage her hypertension, with the understanding that it may take several weeks to see maximum results. She was instructed to monitor her blood pressure at home and aim for readings close to 120/80. If her blood pressure remains in the 130s or 140s, she should contact the office. If she experiences lightheadedness or dizziness upon standing from a seated position, she should check her blood pressure as this could indicate a drop in blood pressure. Both medications should be taken at night. Prescriptions for both medications, each with a 30-day supply and one refill, were provided.    2. Anxiety.  She reports significant stress and anxiety, contributing to her elevated blood pressure. Symptoms include a racing mind, overthinking, and difficulty turning her mind off. Prozac 10 mg at bedtime was recommended to manage her anxiety, with the potential side effects including dry mouth, constipation, abnormal dreams, fogginess, and nausea discussed. She was advised that it might take 6 to 8 weeks to see the full effect of Prozac, with some improvement possibly noticeable around 4 weeks. If she does not notice improvement, other medications like Cymbalta may be  considered. She was advised to continue using essential oils if they provide relief.    Follow-up  A follow-up visit is scheduled in 4 to 6 weeks.           Assessment and Plan     Diagnoses and all orders for this visit:    1. Primary hypertension (Primary)    2. Anxiety    Other orders  -     amLODIPine (NORVASC) 2.5 MG tablet; Take 1 tablet by mouth Daily.  Dispense: 30 tablet; Refill: 1  -     FLUoxetine (PROzac) 10 MG capsule; Take 1 capsule by mouth Daily.  Dispense: 30 capsule; Refill: 1             Follow Up     No follow-ups on file.  Patient was given instructions and counseling regarding her condition or for health maintenance advice. Please see specific information pulled into the AVS if appropriate.       Patient or patient representative verbalized consent for the use of Ambient Listening during the visit with  LUISA Ruiz for chart documentation. 9/6/2024  11:20 EDT

## 2024-10-07 RX ORDER — PANTOPRAZOLE SODIUM 40 MG/1
40 TABLET, DELAYED RELEASE ORAL DAILY
Qty: 30 TABLET | Refills: 1 | Status: SHIPPED | OUTPATIENT
Start: 2024-10-07

## 2024-10-09 ENCOUNTER — OFFICE VISIT (OUTPATIENT)
Dept: FAMILY MEDICINE CLINIC | Facility: CLINIC | Age: 49
End: 2024-10-09
Payer: COMMERCIAL

## 2024-10-09 VITALS
BODY MASS INDEX: 30.92 KG/M2 | DIASTOLIC BLOOD PRESSURE: 90 MMHG | HEART RATE: 80 BPM | WEIGHT: 204 LBS | SYSTOLIC BLOOD PRESSURE: 126 MMHG | HEIGHT: 68 IN | OXYGEN SATURATION: 97 %

## 2024-10-09 DIAGNOSIS — F41.9 ANXIETY: ICD-10-CM

## 2024-10-09 DIAGNOSIS — I10 PRIMARY HYPERTENSION: Primary | ICD-10-CM

## 2024-10-09 PROCEDURE — 99214 OFFICE O/P EST MOD 30 MIN: CPT | Performed by: NURSE PRACTITIONER

## 2024-10-09 NOTE — PROGRESS NOTES
"Chief Complaint  Hypertension (Follow up )    Subjective        Aamir White presents to [facility]  Hypertension      History of Present Illness  The patient presents for evaluation of multiple medical concerns.    She expresses dissatisfaction with her current blood pressure medication, attributing persistent headaches to its use. She has been monitoring her blood pressure at [facility], where it was recorded as 126 systolic today. Her blood pressure reading on Monday was 134/94. Prior to starting the medication, she experienced dizzy spells but not headaches. She reports a decrease in stress levels and improved sleep quality. She does not experience any ankle swelling but mentions discomfort in her calf, which she first noticed a week ago while showering. She is considering natural remedies such as beet juice and magnesium supplements for her blood pressure management. Additionally, she uses cinnamon oil and maintains an active lifestyle, including running laps in her basement.    She has been taking Prozac 10 mg for the past month and is uncertain about its effects. She has noticed an increase in daytime napping, often falling asleep on the couch after work.    Objective   Vital Signs:  /90 (BP Location: Left arm, Patient Position: Sitting, Cuff Size: Large Adult)   Pulse 80   Ht 171.5 cm (67.52\")   Wt 92.5 kg (204 lb)   SpO2 97%   BMI 31.46 kg/m²   Estimated body mass index is 31.46 kg/m² as calculated from the following:    Height as of this encounter: 171.5 cm (67.52\").    Weight as of this encounter: 92.5 kg (204 lb).               Physical Exam  Vitals reviewed.   Constitutional:       Appearance: Normal appearance.   Skin:     General: Skin is warm and dry.   Neurological:      Mental Status: She is alert and oriented to person, place, and time.   Psychiatric:         Mood and Affect: Mood normal.       Physical Exam      Result Review " :          Results      Assessment & Plan  1. Hypertension.  Her diastolic blood pressure remains elevated at 90, despite being on a low dose of amlodipine (2.5 mg). She reports experiencing headaches and leg tightness, which may be side effects of the medication. The potential side effects of amlodipine, including leg swelling at higher doses, were discussed. The option of switching to hydrochlorothiazide was also considered, with a discussion on its mechanism of action and potential side effects, such as increased urination and the need to monitor potassium levels. The combination of amlodipine and hydrochlorothiazide was another alternative, but it may not be available in lower doses. Natural remedies such as magnesium and potassium supplementation, along with sodium reduction, were also discussed as potential strategies for managing her hypertension.  She was also advised to try natural remedies, including juicing and adding different ingredients to her diet, and to consider supplements like cinnamon and black pepper. Magnesium supplementation was also recommended.    2. Anxiety.  She has been on Prozac 10 mg for 4 weeks, but it typically takes 6 to 8 weeks for the full effect to manifest. The combination of Prozac and her blood pressure medication may be contributing to her symptoms. The plan is to continue with the current dose of Prozac and monitor her response. If there is no noticeable improvement after 8 weeks, the dose of Prozac may be increased to 20 mg.               Assessment and Plan     Diagnoses and all orders for this visit:    1. Primary hypertension (Primary)    2. Anxiety             Follow Up     No follow-ups on file.  Patient was given instructions and counseling regarding her condition or for health maintenance advice. Please see specific information pulled into the AVS if appropriate.       Patient or patient representative verbalized consent for the use of Ambient Listening during the  visit with  LUISA Ruiz for chart documentation. 10/9/2024  10:39 EDT

## 2024-12-12 RX ORDER — PANTOPRAZOLE SODIUM 40 MG/1
40 TABLET, DELAYED RELEASE ORAL DAILY
Qty: 30 TABLET | Refills: 1 | Status: SHIPPED | OUTPATIENT
Start: 2024-12-12

## 2025-03-05 ENCOUNTER — OFFICE VISIT (OUTPATIENT)
Dept: FAMILY MEDICINE CLINIC | Facility: CLINIC | Age: 50
End: 2025-03-05
Payer: COMMERCIAL

## 2025-03-05 VITALS
HEIGHT: 68 IN | HEART RATE: 81 BPM | DIASTOLIC BLOOD PRESSURE: 84 MMHG | BODY MASS INDEX: 33.4 KG/M2 | SYSTOLIC BLOOD PRESSURE: 136 MMHG | WEIGHT: 220.4 LBS | OXYGEN SATURATION: 96 %

## 2025-03-05 DIAGNOSIS — I10 PRIMARY HYPERTENSION: ICD-10-CM

## 2025-03-05 RX ORDER — PHENTERMINE HYDROCHLORIDE 37.5 MG/1
37.5 CAPSULE ORAL EVERY MORNING
Qty: 30 CAPSULE | Refills: 0 | Status: SHIPPED | OUTPATIENT
Start: 2025-03-05

## 2025-03-11 NOTE — PROGRESS NOTES
"Chief Complaint  Weight Loss (Wants to discuss weight loss )    Subjective        Aamir White presents to [facility]  Weight Loss    History of Present Illness  The patient presents for evaluation of weight management, hypertension, depression and anxiety.    She reports an improvement in her overall health status. She has discontinued the use of Carafate but maintains a supply at home. She occasionally takes amlodipine and keeps Prozac as a backup medication.    She is seeking alternative weight loss options due to her new insurance's lack of coverage for [employer]. She expresses a desire to resume phentermine treatment, citing its previous efficacy in reducing knee pressure when she was at a lower weight. She recalls using phentermine capsules in the past but prefers tablets for their flexibility in dosage adjustment. She is aware of the potential side effect of insomnia with phentermine use.    She has been managing her knee condition effectively through natural products and juicing, but acknowledges that her weight significantly contributes to the pressure on her knees.    She reports a significant improvement in her mental health, attributing this to the discontinuation of Prozac. She describes a sensation of relief, as if a dark cloud has lifted. She also notes a reduction in work-related stress and has communicated her boundaries to her manager. She has been employed at her current job for 20 years and finds it challenging to balance her professional and personal life. She recently applied to a [employer] agency and was accepted, which she finds empowering.    MEDICATIONS  Current: Amlodipine, Prozac, sucralfate    Objective   Vital Signs:  /84 (BP Location: Left arm, Patient Position: Sitting, Cuff Size: Large Adult)   Pulse 81   Ht 171.5 cm (67.52\")   Wt 100 kg (220 lb 6.4 oz)   SpO2 96%   BMI 33.99 kg/m²   Estimated body mass index is 33.99 kg/m² as calculated from " "the following:    Height as of this encounter: 171.5 cm (67.52\").    Weight as of this encounter: 100 kg (220 lb 6.4 oz).               Physical Exam  Constitutional:       General: She is not in acute distress.     Appearance: She is well-developed. She is not diaphoretic.   Cardiovascular:      Rate and Rhythm: Normal rate and regular rhythm.      Heart sounds: Normal heart sounds. No murmur heard.     No friction rub. No gallop.   Pulmonary:      Effort: Pulmonary effort is normal. No respiratory distress.      Breath sounds: Normal breath sounds. No wheezing or rales.   Musculoskeletal:      Cervical back: Neck supple.   Skin:     General: Skin is warm and dry.   Neurological:      Mental Status: She is alert and oriented to person, place, and time.       Physical Exam  Vital Signs  Blood pressure is 136/80. Weight is 220 pounds.    Result Review :          Results      Assessment & Plan  1. Hypertension.  Her blood pressure readings are currently borderline, with a recent measurement of 136/80. She is not taking amlodipine regularly but uses it as needed. She is advised to monitor her blood pressure closely. If her blood pressure increases, she should start taking amlodipine daily.    2. Weight management.  She is considering returning to phentermine for weight loss due to insurance issues with [employer]. The pros and cons of phentermine, including its potential to cause insomnia and its short-term effectiveness, were discussed. She currently weighs 220 pounds, down from 200 pounds, and a target weight of 180 pounds was suggested. She is advised to start with phentermine tablets, which can be halved if needed. If the tablets wear off too soon, she can switch to capsules. A prescription for phentermine will be sent to [facility] in [place].    3. Depression and anxiety.  She is not currently taking Prozac regularly but has it as a backup. The importance of taking Prozac consistently to achieve its full effect was " discussed. It takes 4 to 6 weeks to notice improvement. She is advised to continue setting boundaries at work and managing stress as she has been doing.    4. Knee pain.  She reports that weight loss helps alleviate pressure on her knees. She is encouraged to continue with her weight loss efforts to manage her knee pain.    Follow-up  The patient will follow up in 1 month.           Assessment and Plan     Diagnoses and all orders for this visit:    1. BMI 34.0-34.9,adult (Primary)  -     phentermine 37.5 MG capsule; Take 1 capsule by mouth Every Morning.  Dispense: 30 capsule; Refill: 0    2. Primary hypertension             Follow Up     No follow-ups on file.  Patient was given instructions and counseling regarding her condition or for health maintenance advice. Please see specific information pulled into the AVS if appropriate.       Patient or patient representative verbalized consent for the use of Ambient Listening during the visit with  LUISA Ruiz for chart documentation. 3/11/2025  14:50 EDT

## 2025-04-09 ENCOUNTER — OFFICE VISIT (OUTPATIENT)
Dept: FAMILY MEDICINE CLINIC | Facility: CLINIC | Age: 50
End: 2025-04-09
Payer: COMMERCIAL

## 2025-04-09 VITALS
OXYGEN SATURATION: 97 % | HEIGHT: 68 IN | DIASTOLIC BLOOD PRESSURE: 86 MMHG | HEART RATE: 83 BPM | BODY MASS INDEX: 31.83 KG/M2 | WEIGHT: 210 LBS | SYSTOLIC BLOOD PRESSURE: 122 MMHG

## 2025-04-09 DIAGNOSIS — Z00.00 ANNUAL PHYSICAL EXAM: Primary | ICD-10-CM

## 2025-04-09 DIAGNOSIS — E55.9 AVITAMINOSIS D: ICD-10-CM

## 2025-04-09 DIAGNOSIS — Z76.89 ENCOUNTER FOR WEIGHT MANAGEMENT: ICD-10-CM

## 2025-04-09 RX ORDER — PHENTERMINE HYDROCHLORIDE 37.5 MG/1
37.5 CAPSULE ORAL EVERY MORNING
Qty: 30 CAPSULE | Refills: 0 | Status: SHIPPED | OUTPATIENT
Start: 2025-04-09

## 2025-04-10 LAB
25(OH)D3+25(OH)D2 SERPL-MCNC: 25.9 NG/ML (ref 30–100)
ALBUMIN SERPL-MCNC: 3.9 G/DL (ref 3.5–5.2)
ALBUMIN/GLOB SERPL: 1.4 G/DL
ALP SERPL-CCNC: 89 U/L (ref 39–117)
ALT SERPL-CCNC: 19 U/L (ref 1–33)
AST SERPL-CCNC: 22 U/L (ref 1–32)
BASOPHILS # BLD AUTO: 0.04 10*3/MM3 (ref 0–0.2)
BASOPHILS NFR BLD AUTO: 0.7 % (ref 0–1.5)
BILIRUB SERPL-MCNC: 0.7 MG/DL (ref 0–1.2)
BUN SERPL-MCNC: 9 MG/DL (ref 6–20)
BUN/CREAT SERPL: 11.5 (ref 7–25)
CALCIUM SERPL-MCNC: 9.2 MG/DL (ref 8.6–10.5)
CHLORIDE SERPL-SCNC: 104 MMOL/L (ref 98–107)
CHOLEST SERPL-MCNC: 165 MG/DL (ref 0–200)
CO2 SERPL-SCNC: 27.5 MMOL/L (ref 22–29)
CREAT SERPL-MCNC: 0.78 MG/DL (ref 0.57–1)
EGFRCR SERPLBLD CKD-EPI 2021: 94.4 ML/MIN/1.73
EOSINOPHIL # BLD AUTO: 0.24 10*3/MM3 (ref 0–0.4)
EOSINOPHIL NFR BLD AUTO: 4.2 % (ref 0.3–6.2)
ERYTHROCYTE [DISTWIDTH] IN BLOOD BY AUTOMATED COUNT: 13.3 % (ref 12.3–15.4)
GLOBULIN SER CALC-MCNC: 2.8 GM/DL
GLUCOSE SERPL-MCNC: 77 MG/DL (ref 65–99)
HBA1C MFR BLD: 4.9 % (ref 4.8–5.6)
HCT VFR BLD AUTO: 39.3 % (ref 34–46.6)
HDLC SERPL-MCNC: 59 MG/DL (ref 40–60)
HGB BLD-MCNC: 12.9 G/DL (ref 12–15.9)
IMM GRANULOCYTES # BLD AUTO: 0.05 10*3/MM3 (ref 0–0.05)
IMM GRANULOCYTES NFR BLD AUTO: 0.9 % (ref 0–0.5)
LDLC SERPL CALC-MCNC: 97 MG/DL (ref 0–100)
LDLC/HDLC SERPL: 1.65 {RATIO}
LYMPHOCYTES # BLD AUTO: 1.53 10*3/MM3 (ref 0.7–3.1)
LYMPHOCYTES NFR BLD AUTO: 27.1 % (ref 19.6–45.3)
MCH RBC QN AUTO: 30.8 PG (ref 26.6–33)
MCHC RBC AUTO-ENTMCNC: 32.8 G/DL (ref 31.5–35.7)
MCV RBC AUTO: 93.8 FL (ref 79–97)
MONOCYTES # BLD AUTO: 0.56 10*3/MM3 (ref 0.1–0.9)
MONOCYTES NFR BLD AUTO: 9.9 % (ref 5–12)
NEUTROPHILS # BLD AUTO: 3.23 10*3/MM3 (ref 1.7–7)
NEUTROPHILS NFR BLD AUTO: 57.2 % (ref 42.7–76)
NRBC BLD AUTO-RTO: 0 /100 WBC (ref 0–0.2)
PLATELET # BLD AUTO: 208 10*3/MM3 (ref 140–450)
POTASSIUM SERPL-SCNC: 4.4 MMOL/L (ref 3.5–5.2)
PROT SERPL-MCNC: 6.7 G/DL (ref 6–8.5)
RBC # BLD AUTO: 4.19 10*6/MM3 (ref 3.77–5.28)
SODIUM SERPL-SCNC: 139 MMOL/L (ref 136–145)
TRIGL SERPL-MCNC: 42 MG/DL (ref 0–150)
TSH SERPL DL<=0.005 MIU/L-ACNC: 1.06 UIU/ML (ref 0.27–4.2)
VLDLC SERPL CALC-MCNC: 9 MG/DL (ref 5–40)
WBC # BLD AUTO: 5.65 10*3/MM3 (ref 3.4–10.8)

## 2025-04-18 NOTE — PROGRESS NOTES
"Chief Complaint  Annual Exam    Subjective        Aamir White presents to [facility]  History of Present Illness  History of Present Illness  The patient presents for weight management and cancer.    She reports a perceived lack of progress in her weight loss journey this month, despite a documented weight loss of 10 pounds. She experiences sleep disturbances, which she manages with the use of oils. She has observed that if she does not consume the medication early enough or hydrate sufficiently, her sleep is adversely affected. She expresses a preference for continuing with the capsule form of the medication, which she abstains from taking on weekends.        MEDICATIONS  Current: Phentermine.    Objective   Vital Signs:  /86 (BP Location: Left arm, Patient Position: Sitting, Cuff Size: Large Adult)   Pulse 83   Ht 171.5 cm (67.52\")   Wt 95.3 kg (210 lb)   SpO2 97%   BMI 32.39 kg/m²   Estimated body mass index is 32.39 kg/m² as calculated from the following:    Height as of this encounter: 171.5 cm (67.52\").    Weight as of this encounter: 95.3 kg (210 lb).               Physical Exam  Constitutional:       General: She is not in acute distress.     Appearance: She is well-developed. She is not diaphoretic.   Cardiovascular:      Rate and Rhythm: Normal rate and regular rhythm.      Heart sounds: Normal heart sounds. No murmur heard.     No friction rub. No gallop.   Pulmonary:      Effort: Pulmonary effort is normal. No respiratory distress.      Breath sounds: Normal breath sounds. No wheezing or rales.   Musculoskeletal:      Cervical back: Neck supple.   Skin:     General: Skin is warm and dry.   Neurological:      Mental Status: She is alert and oriented to person, place, and time.       Physical Exam  Vital Signs  Weight is 210.    Result Review :          Results      Assessment & Plan  1. Weight management.  She has demonstrated significant progress in her " weight loss journey, with a documented reduction of 10 pounds since March 2025. She is advised to maintain her current dietary habits and continue with the capsule form of phentermine, as it has been more effective than the tablet form. The prescription for phentermine will be sent to [facility] in [place]. She is also encouraged to stay hydrated and take the medication early in the day to minimize sleep disturbances. A referral to Dr. Aguilera or Paty will be made for further management of her phentermine regimen during the provider's absence.    2. Health maintenance  Routine labs today  Mammogram is up to date  Pap is up to date  Colonoscopy is current, next due 2029  Tdap given             Assessment and Plan     Diagnoses and all orders for this visit:    1. Annual physical exam (Primary)  -     Hemoglobin A1c  -     CBC & Differential  -     Comprehensive Metabolic Panel  -     Lipid Panel With LDL / HDL Ratio  -     TSH Rfx On Abnormal To Free T4  -     Tdap Vaccine => 8yo IM (BOOSTRIX/ADACEL)    2. BMI 34.0-34.9,adult  -     phentermine 37.5 MG capsule; Take 1 capsule by mouth Every Morning.  Dispense: 30 capsule; Refill: 0  -     Vitamin D 25 hydroxy    3. Avitaminosis D    4. Encounter for weight management        Information/counseling provided to the patient regarding periodic jasmin maintenance recommendations, including but not limited to immunizations, diet/exercise/healthy lifestyle, laboratory, and other screenings. BMI is discussed. Appropriate exercise, diet, and weight plans are discussed.         Follow Up     No follow-ups on file.  Patient was given instructions and counseling regarding her condition or for health maintenance advice. Please see specific information pulled into the AVS if appropriate.       Patient or patient representative verbalized consent for the use of Ambient Listening during the visit with  LUISA Ruiz for chart documentation. 4/18/2025  09:09 EDT